# Patient Record
Sex: FEMALE | Race: WHITE | Employment: FULL TIME | URBAN - METROPOLITAN AREA
[De-identification: names, ages, dates, MRNs, and addresses within clinical notes are randomized per-mention and may not be internally consistent; named-entity substitution may affect disease eponyms.]

---

## 2017-01-03 ENCOUNTER — TELEPHONE (OUTPATIENT)
Dept: FAMILY MEDICINE CLINIC | Age: 47
End: 2017-01-03

## 2017-02-03 ENCOUNTER — OFFICE VISIT (OUTPATIENT)
Dept: FAMILY MEDICINE CLINIC | Age: 47
End: 2017-02-03

## 2017-02-03 VITALS
WEIGHT: 200 LBS | BODY MASS INDEX: 34.87 KG/M2 | SYSTOLIC BLOOD PRESSURE: 126 MMHG | TEMPERATURE: 98.4 F | RESPIRATION RATE: 20 BRPM | DIASTOLIC BLOOD PRESSURE: 74 MMHG | HEART RATE: 92 BPM

## 2017-02-03 DIAGNOSIS — J01.90 ACUTE NON-RECURRENT SINUSITIS, UNSPECIFIED LOCATION: Primary | ICD-10-CM

## 2017-02-03 PROCEDURE — 99213 OFFICE O/P EST LOW 20 MIN: CPT | Performed by: FAMILY MEDICINE

## 2017-02-03 RX ORDER — BENZONATATE 200 MG/1
200 CAPSULE ORAL 3 TIMES DAILY PRN
Qty: 30 CAPSULE | Refills: 0 | Status: SHIPPED | OUTPATIENT
Start: 2017-02-03 | End: 2017-02-10

## 2017-02-03 RX ORDER — AMOXICILLIN AND CLAVULANATE POTASSIUM 875; 125 MG/1; MG/1
1 TABLET, FILM COATED ORAL EVERY 12 HOURS
Qty: 20 TABLET | Refills: 0 | Status: SHIPPED | OUTPATIENT
Start: 2017-02-03 | End: 2017-02-13

## 2017-02-15 ENCOUNTER — E-VISIT (OUTPATIENT)
Dept: FAMILY MEDICINE CLINIC | Age: 47
End: 2017-02-15

## 2017-02-15 DIAGNOSIS — J01.90 ACUTE SINUSITIS, RECURRENCE NOT SPECIFIED, UNSPECIFIED LOCATION: Primary | ICD-10-CM

## 2017-02-15 PROCEDURE — 99444 PR PHYSICIAN ONLINE EVALUATION & MANAGEMENT SERVICE: CPT | Performed by: FAMILY MEDICINE

## 2017-02-15 RX ORDER — AMOXICILLIN AND CLAVULANATE POTASSIUM 875; 125 MG/1; MG/1
1 TABLET, FILM COATED ORAL EVERY 12 HOURS
Qty: 20 TABLET | Refills: 0 | Status: SHIPPED | OUTPATIENT
Start: 2017-02-15 | End: 2017-02-17

## 2017-02-15 ASSESSMENT — LIFESTYLE VARIABLES: SMOKING_STATUS: NO

## 2017-02-17 ENCOUNTER — PATIENT MESSAGE (OUTPATIENT)
Dept: FAMILY MEDICINE CLINIC | Age: 47
End: 2017-02-17

## 2017-02-17 RX ORDER — CEFUROXIME AXETIL 500 MG/1
500 TABLET ORAL 2 TIMES DAILY
Qty: 14 TABLET | Refills: 0 | Status: SHIPPED | OUTPATIENT
Start: 2017-02-17 | End: 2017-02-24

## 2017-03-21 ENCOUNTER — OFFICE VISIT (OUTPATIENT)
Dept: FAMILY MEDICINE CLINIC | Age: 47
End: 2017-03-21

## 2017-03-21 VITALS
HEART RATE: 83 BPM | TEMPERATURE: 98 F | RESPIRATION RATE: 22 BRPM | BODY MASS INDEX: 34.87 KG/M2 | DIASTOLIC BLOOD PRESSURE: 79 MMHG | WEIGHT: 200 LBS | SYSTOLIC BLOOD PRESSURE: 120 MMHG | OXYGEN SATURATION: 98 %

## 2017-03-21 DIAGNOSIS — R27.8 CLUMSINESS: ICD-10-CM

## 2017-03-21 DIAGNOSIS — E78.00 PURE HYPERCHOLESTEROLEMIA: ICD-10-CM

## 2017-03-21 DIAGNOSIS — R22.31 AXILLARY MASS, RIGHT: Primary | ICD-10-CM

## 2017-03-21 PROCEDURE — 99214 OFFICE O/P EST MOD 30 MIN: CPT | Performed by: FAMILY MEDICINE

## 2017-04-01 DIAGNOSIS — E78.00 PURE HYPERCHOLESTEROLEMIA: ICD-10-CM

## 2017-04-01 LAB
A/G RATIO: 1.3 (ref 1.1–2.2)
ALBUMIN SERPL-MCNC: 4.3 G/DL (ref 3.4–5)
ALP BLD-CCNC: 81 U/L (ref 40–129)
ALT SERPL-CCNC: 24 U/L (ref 10–40)
ANION GAP SERPL CALCULATED.3IONS-SCNC: 15 MMOL/L (ref 3–16)
AST SERPL-CCNC: 21 U/L (ref 15–37)
BILIRUB SERPL-MCNC: 0.6 MG/DL (ref 0–1)
BUN BLDV-MCNC: 9 MG/DL (ref 7–20)
CALCIUM SERPL-MCNC: 9.6 MG/DL (ref 8.3–10.6)
CHLORIDE BLD-SCNC: 101 MMOL/L (ref 99–110)
CHOLESTEROL, TOTAL: 206 MG/DL (ref 0–199)
CO2: 26 MMOL/L (ref 21–32)
CREAT SERPL-MCNC: 0.5 MG/DL (ref 0.6–1.1)
GFR AFRICAN AMERICAN: >60
GFR NON-AFRICAN AMERICAN: >60
GLOBULIN: 3.2 G/DL
GLUCOSE BLD-MCNC: 91 MG/DL (ref 70–99)
HDLC SERPL-MCNC: 40 MG/DL (ref 40–60)
LDL CHOLESTEROL CALCULATED: 134 MG/DL
POTASSIUM SERPL-SCNC: 4.9 MMOL/L (ref 3.5–5.1)
SODIUM BLD-SCNC: 142 MMOL/L (ref 136–145)
TOTAL PROTEIN: 7.5 G/DL (ref 6.4–8.2)
TRIGL SERPL-MCNC: 159 MG/DL (ref 0–150)
TSH REFLEX: 2.23 UIU/ML (ref 0.27–4.2)
VLDLC SERPL CALC-MCNC: 32 MG/DL

## 2017-04-17 ENCOUNTER — OFFICE VISIT (OUTPATIENT)
Dept: DERMATOLOGY | Age: 47
End: 2017-04-17

## 2017-04-17 DIAGNOSIS — L82.1 SEBORRHEIC KERATOSIS: ICD-10-CM

## 2017-04-17 DIAGNOSIS — Z80.8 FAMILY HISTORY OF MELANOMA: ICD-10-CM

## 2017-04-17 DIAGNOSIS — D22.9 MULTIPLE NEVI: Primary | ICD-10-CM

## 2017-04-17 DIAGNOSIS — L30.9 DERMATITIS: ICD-10-CM

## 2017-04-17 PROCEDURE — 99214 OFFICE O/P EST MOD 30 MIN: CPT | Performed by: DERMATOLOGY

## 2017-04-17 RX ORDER — CLOBETASOL PROPIONATE 0.5 MG/G
CREAM TOPICAL
Qty: 30 G | Refills: 0 | Status: SHIPPED | OUTPATIENT
Start: 2017-04-17 | End: 2018-04-23

## 2017-04-27 DIAGNOSIS — G43.009 MIGRAINE WITHOUT AURA AND WITHOUT STATUS MIGRAINOSUS, NOT INTRACTABLE: ICD-10-CM

## 2017-04-27 DIAGNOSIS — R00.2 PALPITATIONS: ICD-10-CM

## 2017-05-10 RX ORDER — PROPRANOLOL HYDROCHLORIDE 10 MG/1
TABLET ORAL
Qty: 90 TABLET | Refills: 1 | Status: SHIPPED | OUTPATIENT
Start: 2017-05-10 | End: 2020-02-07 | Stop reason: SDUPTHER

## 2017-05-10 RX ORDER — SUMATRIPTAN 50 MG/1
50 TABLET, FILM COATED ORAL
Qty: 27 TABLET | Refills: 1 | Status: SHIPPED | OUTPATIENT
Start: 2017-05-10 | End: 2020-08-07 | Stop reason: ALTCHOICE

## 2017-12-20 ENCOUNTER — TELEPHONE (OUTPATIENT)
Dept: FAMILY MEDICINE CLINIC | Age: 47
End: 2017-12-20

## 2017-12-20 NOTE — TELEPHONE ENCOUNTER
Per HIPPA it is ok to leave message on patient voicemail  [facility] informing pt of Dr. Weems Cluster message to for her to call back if she has any other concerns.

## 2017-12-20 NOTE — TELEPHONE ENCOUNTER
Tell her to keep a diary of everything she ate 12 to 24 hours before the rxn.   If it happens again she can look back to see if there is a trend

## 2017-12-20 NOTE — TELEPHONE ENCOUNTER
While calling pilar flores for PT/INR maintenance plan I was on the phone w/ his daughter Parish Edouard and informed her of the INR recheck and plan. Meanwhile she stated that she went to the ER last night for an allergic reaction to something but does not know what it was because she did not have anything new to eat or drink and no new meds she does not know how it happened. She states she had swollen lips, red and numb. She did no have any SOB, trouble breathing or swollen throat. They gave her famotidine, benadryl and prednisone to take and told her to f/u w/PCP if need be. She would like to know if there is anything else you would like for her to do.  Please advise

## 2018-03-23 ENCOUNTER — PATIENT MESSAGE (OUTPATIENT)
Dept: FAMILY MEDICINE CLINIC | Age: 48
End: 2018-03-23

## 2018-03-23 DIAGNOSIS — J01.00 ACUTE NON-RECURRENT MAXILLARY SINUSITIS: Primary | ICD-10-CM

## 2018-03-23 RX ORDER — CEFUROXIME AXETIL 500 MG/1
500 TABLET ORAL 2 TIMES DAILY
Qty: 20 TABLET | Refills: 0 | Status: SHIPPED | OUTPATIENT
Start: 2018-03-23 | End: 2018-04-02

## 2018-04-23 ENCOUNTER — OFFICE VISIT (OUTPATIENT)
Dept: DERMATOLOGY | Age: 48
End: 2018-04-23

## 2018-04-23 DIAGNOSIS — L82.1 SEBORRHEIC KERATOSIS: ICD-10-CM

## 2018-04-23 DIAGNOSIS — L30.9 DERMATITIS: ICD-10-CM

## 2018-04-23 DIAGNOSIS — Z80.8 FAMILY HISTORY OF MALIGNANT MELANOMA: Primary | ICD-10-CM

## 2018-04-23 DIAGNOSIS — D22.9 MULTIPLE NEVI: ICD-10-CM

## 2018-04-23 PROCEDURE — G8427 DOCREV CUR MEDS BY ELIG CLIN: HCPCS | Performed by: DERMATOLOGY

## 2018-04-23 PROCEDURE — 1036F TOBACCO NON-USER: CPT | Performed by: DERMATOLOGY

## 2018-04-23 PROCEDURE — G8421 BMI NOT CALCULATED: HCPCS | Performed by: DERMATOLOGY

## 2018-04-23 PROCEDURE — 99214 OFFICE O/P EST MOD 30 MIN: CPT | Performed by: DERMATOLOGY

## 2018-04-23 RX ORDER — CLOBETASOL PROPIONATE 0.5 MG/G
OINTMENT TOPICAL
Qty: 60 G | Refills: 1 | Status: SHIPPED | OUTPATIENT
Start: 2018-04-23 | End: 2018-10-15 | Stop reason: SDUPTHER

## 2018-04-23 RX ORDER — FLUOCINONIDE 1 MG/G
CREAM TOPICAL
Qty: 120 G | Refills: 1 | Status: SHIPPED | OUTPATIENT
Start: 2018-04-23 | End: 2018-08-03 | Stop reason: SDUPTHER

## 2018-07-25 NOTE — TELEPHONE ENCOUNTER
From: Charli Devi  Sent: 7/25/2018 10:13 AM EDT  Subject: Medication Renewal Request    Karen Gray.  Mike would like a refill of the following medications:     Fluocinonide (VANOS) 0.1 % CREA [Evelia Prince MD]     clobetasol (TEMOVATE) 0.05 % ointment Keesha Acosta MD]    Preferred pharmacy: [facility] [address] [facility] [place]

## 2018-07-26 RX ORDER — CLOBETASOL PROPIONATE 0.5 MG/G
OINTMENT TOPICAL
Qty: 60 G | Refills: 1 | OUTPATIENT
Start: 2018-07-26

## 2018-07-26 RX ORDER — FLUOCINONIDE 1 MG/G
CREAM TOPICAL
Qty: 120 G | Refills: 1 | OUTPATIENT
Start: 2018-07-26

## 2018-08-03 RX ORDER — FLUOCINONIDE 1 MG/G
CREAM TOPICAL
Qty: 120 G | Refills: 0 | Status: SHIPPED | OUTPATIENT
Start: 2018-08-03 | End: 2018-10-15 | Stop reason: SDUPTHER

## 2018-10-15 RX ORDER — FLUOCINONIDE 1 MG/G
CREAM TOPICAL
Qty: 120 G | Refills: 0 | Status: SHIPPED | OUTPATIENT
Start: 2018-10-15 | End: 2020-08-07 | Stop reason: SDUPTHER

## 2018-10-15 RX ORDER — CLOBETASOL PROPIONATE 0.5 MG/G
OINTMENT TOPICAL
Qty: 60 G | Refills: 1 | Status: SHIPPED | OUTPATIENT
Start: 2018-10-15 | End: 2020-02-07 | Stop reason: SDUPTHER

## 2019-04-10 ENCOUNTER — OFFICE VISIT (OUTPATIENT)
Dept: FAMILY MEDICINE CLINIC | Age: 49
End: 2019-04-10
Payer: COMMERCIAL

## 2019-04-10 VITALS
OXYGEN SATURATION: 97 % | TEMPERATURE: 97.1 F | SYSTOLIC BLOOD PRESSURE: 132 MMHG | WEIGHT: 229.5 LBS | BODY MASS INDEX: 40.02 KG/M2 | HEART RATE: 89 BPM | RESPIRATION RATE: 16 BRPM | DIASTOLIC BLOOD PRESSURE: 78 MMHG

## 2019-04-10 DIAGNOSIS — Z00.00 WELL ADULT EXAM: ICD-10-CM

## 2019-04-10 DIAGNOSIS — F41.1 GENERALIZED ANXIETY DISORDER: ICD-10-CM

## 2019-04-10 DIAGNOSIS — J20.9 ACUTE BRONCHITIS, UNSPECIFIED ORGANISM: Primary | ICD-10-CM

## 2019-04-10 DIAGNOSIS — R20.0 NUMBNESS OF TOES: ICD-10-CM

## 2019-04-10 LAB
A/G RATIO: 1.2 (ref 1.1–2.2)
ALBUMIN SERPL-MCNC: 4.4 G/DL (ref 3.4–5)
ALP BLD-CCNC: 107 U/L (ref 40–129)
ALT SERPL-CCNC: 57 U/L (ref 10–40)
ANION GAP SERPL CALCULATED.3IONS-SCNC: 13 MMOL/L (ref 3–16)
AST SERPL-CCNC: 64 U/L (ref 15–37)
BILIRUB SERPL-MCNC: 0.6 MG/DL (ref 0–1)
BUN BLDV-MCNC: 8 MG/DL (ref 7–20)
CALCIUM SERPL-MCNC: 9.9 MG/DL (ref 8.3–10.6)
CHLORIDE BLD-SCNC: 97 MMOL/L (ref 99–110)
CHOLESTEROL, TOTAL: 214 MG/DL (ref 0–199)
CO2: 26 MMOL/L (ref 21–32)
CREAT SERPL-MCNC: 0.6 MG/DL (ref 0.6–1.1)
GFR AFRICAN AMERICAN: >60
GFR NON-AFRICAN AMERICAN: >60
GLOBULIN: 3.8 G/DL
GLUCOSE BLD-MCNC: 106 MG/DL (ref 70–99)
HCT VFR BLD CALC: 37.6 % (ref 36–48)
HDLC SERPL-MCNC: 37 MG/DL (ref 40–60)
HEMOGLOBIN: 12.1 G/DL (ref 12–16)
LDL CHOLESTEROL CALCULATED: 137 MG/DL
MCH RBC QN AUTO: 25.6 PG (ref 26–34)
MCHC RBC AUTO-ENTMCNC: 32.3 G/DL (ref 31–36)
MCV RBC AUTO: 79.3 FL (ref 80–100)
PDW BLD-RTO: 17.5 % (ref 12.4–15.4)
PLATELET # BLD: 357 K/UL (ref 135–450)
PMV BLD AUTO: 8.7 FL (ref 5–10.5)
POTASSIUM SERPL-SCNC: 4.9 MMOL/L (ref 3.5–5.1)
RBC # BLD: 4.74 M/UL (ref 4–5.2)
SODIUM BLD-SCNC: 136 MMOL/L (ref 136–145)
TOTAL PROTEIN: 8.2 G/DL (ref 6.4–8.2)
TRIGL SERPL-MCNC: 201 MG/DL (ref 0–150)
TSH REFLEX: 3.03 UIU/ML (ref 0.27–4.2)
VITAMIN B-12: 476 PG/ML (ref 211–911)
VLDLC SERPL CALC-MCNC: 40 MG/DL
WBC # BLD: 12.6 K/UL (ref 4–11)

## 2019-04-10 PROCEDURE — 3017F COLORECTAL CA SCREEN DOC REV: CPT | Performed by: FAMILY MEDICINE

## 2019-04-10 PROCEDURE — G8417 CALC BMI ABV UP PARAM F/U: HCPCS | Performed by: FAMILY MEDICINE

## 2019-04-10 PROCEDURE — G8427 DOCREV CUR MEDS BY ELIG CLIN: HCPCS | Performed by: FAMILY MEDICINE

## 2019-04-10 PROCEDURE — 1036F TOBACCO NON-USER: CPT | Performed by: FAMILY MEDICINE

## 2019-04-10 PROCEDURE — 99214 OFFICE O/P EST MOD 30 MIN: CPT | Performed by: FAMILY MEDICINE

## 2019-04-10 RX ORDER — AZITHROMYCIN 250 MG/1
TABLET, FILM COATED ORAL
Qty: 1 PACKET | Refills: 0 | Status: SHIPPED | OUTPATIENT
Start: 2019-04-10 | End: 2019-04-20

## 2019-04-10 RX ORDER — ESCITALOPRAM OXALATE 10 MG/1
10 TABLET ORAL DAILY
Qty: 30 TABLET | Refills: 5 | Status: SHIPPED | OUTPATIENT
Start: 2019-04-10 | End: 2019-07-12 | Stop reason: SDUPTHER

## 2019-04-10 RX ORDER — SUMATRIPTAN 50 MG/1
50 TABLET, FILM COATED ORAL
COMMUNITY
End: 2019-07-12

## 2019-04-10 ASSESSMENT — PATIENT HEALTH QUESTIONNAIRE - PHQ9
1. LITTLE INTEREST OR PLEASURE IN DOING THINGS: 1
SUM OF ALL RESPONSES TO PHQ9 QUESTIONS 1 & 2: 2
2. FEELING DOWN, DEPRESSED OR HOPELESS: 1
SUM OF ALL RESPONSES TO PHQ QUESTIONS 1-9: 2
SUM OF ALL RESPONSES TO PHQ QUESTIONS 1-9: 2

## 2019-04-10 NOTE — PROGRESS NOTES
oil-omega-3 fatty acids 1000 MG capsule Take 1 g by mouth daily.  ibuprofen (ADVIL;MOTRIN) 800 MG tablet Take 800 mg by mouth every 6 hours as needed. 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 No pertinent past medical history. History reviewed. No pertinent surgical history. Family History   Problem Relation Age of Onset    Stroke Father     High Cholesterol Father     Diabetes Mother     Hypertension Mother     Coronary Art Dis Mother    [place] Migraines Mother         complex    High Cholesterol Mother        Social History     Tobacco Use    Smoking status: Never Smoker    Smokeless tobacco: Never Used   Substance Use Topics    Alcohol use: Yes     Alcohol/week: 0.0 oz     Comment: 1 per month    Drug use: No            Review of Systems  Review of Systems    Objective:   Physical Exam  Vitals:    04/10/19 1113 04/10/19 1135   BP: (!) 144/88 132/78   Pulse: 89    Resp: 16    Temp: 97.1 °F (36.2 °C)    TempSrc: Oral    SpO2: 97%    Weight: 229 lb 8 oz (104.1 kg)        Physical Exam    NAD    No respiratory distress. Mood and affect are mildy anxious. No agitation or psychomotor retardation. Not suicidal. Judgement and insight are intact  Skin turgor normal, no rash. ar exam - bilateral normal, TM intact without perforation or effusion, external canal normal. No significant ceruminosis noted. Nasal exam; septum midline, no deformities, nares patent, normal mucosa with mild swelling, no polyps, no bleeding. Pharynx normal, no oral lesions, dentition in good repair. No cervical, supraclavicular or submandibular LNS.     Lungs bilateral rhonchi, no wheeze or rales. No accessory muscle use. Card reg with no murmer or gallop  Ext - no c/c/e   Bilateral bunions. Sensation normal to monofilament bilateral except directly over bunions. Assessment:       Diagnosis Orders   1. Acute bronchitis, unspecified organism  azithromycin (ZITHROMAX) 250 MG tablet   2. Numbness of toes  Hemoglobin A1C  Like secondary to pressure from shoes; advised shoes with wide toe box. 3. Generalized anxiety disorder  escitalopram (LEXAPRO) 10 MG tablet  Refer to Dr. Kenisha Banks for counseling     4. Well adult exam  Comprehensive Metabolic Panel    TSH with Reflex    Lipid Panel    CBC    Vitamin B12          Plan:      Side effects of current medications reviewed and questions answered. Follow up in 4 weeks or prn.

## 2019-04-11 ENCOUNTER — PATIENT MESSAGE (OUTPATIENT)
Dept: FAMILY MEDICINE CLINIC | Age: 49
End: 2019-04-11

## 2019-04-11 DIAGNOSIS — R74.8 ELEVATED LIVER ENZYMES: ICD-10-CM

## 2019-04-11 DIAGNOSIS — D50.0 IRON DEFICIENCY ANEMIA DUE TO CHRONIC BLOOD LOSS: ICD-10-CM

## 2019-04-11 DIAGNOSIS — E78.00 PURE HYPERCHOLESTEROLEMIA: ICD-10-CM

## 2019-04-11 DIAGNOSIS — D64.9 ANEMIA, UNSPECIFIED TYPE: Primary | ICD-10-CM

## 2019-04-11 DIAGNOSIS — D64.9 ANEMIA, UNSPECIFIED TYPE: ICD-10-CM

## 2019-04-11 DIAGNOSIS — R73.03 PREDIABETES: Primary | ICD-10-CM

## 2019-04-11 LAB
ESTIMATED AVERAGE GLUCOSE: 125.5 MG/DL
FERRITIN: 80.1 NG/ML (ref 15–150)
HBA1C MFR BLD: 6 %
IRON SATURATION: 10 % (ref 15–50)
IRON: 35 UG/DL (ref 37–145)
TOTAL IRON BINDING CAPACITY: 364 UG/DL (ref 260–445)

## 2019-04-11 RX ORDER — METFORMIN HYDROCHLORIDE 500 MG/1
1000 TABLET, EXTENDED RELEASE ORAL
Qty: 180 TABLET | Refills: 1 | Status: SHIPPED | OUTPATIENT
Start: 2019-04-11 | End: 2019-08-22 | Stop reason: SDUPTHER

## 2019-04-11 NOTE — TELEPHONE ENCOUNTER
From: Janna Rendon  To: Lorie Harrison MD  Sent: 4/11/2019 9:29 AM EDT  Subject: Test Results Question    Hi Dr. Lucio Later,    The test results, while disappointing, were not all that surprising. Please go ahead and prescribe the Metformin and I will also focus on diet and exercise. I will schedule a follow up in 3 months.      Thanks,  Mateusz Johns

## 2019-04-25 ENCOUNTER — OFFICE VISIT (OUTPATIENT)
Dept: DERMATOLOGY | Age: 49
End: 2019-04-25
Payer: COMMERCIAL

## 2019-04-25 DIAGNOSIS — D22.9 MULTIPLE NEVI: Primary | ICD-10-CM

## 2019-04-25 DIAGNOSIS — Z80.8 FAMILY HISTORY OF MALIGNANT MELANOMA: ICD-10-CM

## 2019-04-25 DIAGNOSIS — L82.1 SEBORRHEIC KERATOSIS: ICD-10-CM

## 2019-04-25 DIAGNOSIS — L30.9 DERMATITIS: ICD-10-CM

## 2019-04-25 PROCEDURE — G8427 DOCREV CUR MEDS BY ELIG CLIN: HCPCS | Performed by: DERMATOLOGY

## 2019-04-25 PROCEDURE — 3017F COLORECTAL CA SCREEN DOC REV: CPT | Performed by: DERMATOLOGY

## 2019-04-25 PROCEDURE — G8417 CALC BMI ABV UP PARAM F/U: HCPCS | Performed by: DERMATOLOGY

## 2019-04-25 PROCEDURE — 1036F TOBACCO NON-USER: CPT | Performed by: DERMATOLOGY

## 2019-04-25 PROCEDURE — 99214 OFFICE O/P EST MOD 30 MIN: CPT | Performed by: DERMATOLOGY

## 2019-04-25 RX ORDER — CLOBETASOL PROPIONATE 0.5 MG/G
CREAM TOPICAL
Qty: 45 G | Refills: 1 | Status: SHIPPED | OUTPATIENT
Start: 2019-04-25 | End: 2020-08-07 | Stop reason: SDUPTHER

## 2019-04-25 NOTE — PROGRESS NOTES
[facility]  Rylan Pyle MD  [phone]      Murrell Jesus Manuel  1970    46 y.o. female     Date of Visit: 4/25/2019    Chief Complaint: moles, rash  Chief Complaint   Patient presents with    Skin Exam     Last seen: ~ 1 year ago    History of Present Illness:    *Her mom passed away in fall of 2016. Now taking care of dad - 2018.    1, 2. Here for evaluation of multiple asx pigmented lesions on the trunk and extremities, present for many years; no change in size/shape/color of any lesions; no bleeding lesions. Family hx of melanoma. 3. F/u dermatitis - previously on the shin/legs, hands and scalp. rx'd clobetasol in the past - this helps. She has a few scaly pink spots on the R posterior leg recently - asx and no trx tried. No personal hx of skin cancer. Father with hx of melanoma. Wears sunscreen regularly. No tanning bed use. Review of Systems:  Gen: Feels well, good sense of health. Skin: No changing moles or lesions. Past Medical History, Family History, Surgical History, Medications and Allergies reviewed. History reviewed. No pertinent past medical history. History reviewed. No pertinent surgical history.     Outpatient Medications Marked as Taking for the 4/25/19 encounter (Office Visit) with Reta Murillo MD   Medication Sig Dispense Refill    metFORMIN (GLUCOPHAGE-XR) 500 MG extended release tablet Take 2 tablets by mouth daily (with breakfast) 180 tablet 1    SUMAtriptan (IMITREX) 50 MG tablet Take 50 mg by mouth once as needed for Migraine      escitalopram (LEXAPRO) 10 MG tablet Take 1 tablet by mouth daily 30 tablet 5    clobetasol (TEMOVATE) 0.05 % ointment Apply to affected area BID PRN flares 60 g 1    Fluocinonide (VANOS) 0.1 % CREA Apply to affected area BID PRN flares 120 g 0    propranolol (INDERAL) 10 MG tablet Take 1 tablet by mouth  every 6 hours as needed 90 tablet 1    Calcium Carb-Cholecalciferol (CALCIUM-VITAMIN D) 500-400 MG-UNIT TABS Take 1 tablet by mouth daily      atorvastatin (LIPITOR) 10 MG tablet TAKE 1 TABLET DAILY 90 tablet 0    therapeutic multivitamin-minerals (THERAGRAN-M) tablet Take 1 tablet by mouth daily.  fish oil-omega-3 fatty acids 1000 MG capsule Take 1 g by mouth daily.  ibuprofen (ADVIL;MOTRIN) 800 MG tablet Take 800 mg by mouth every 6 hours as needed. Allergies   Allergen Reactions    Demerol     Augmentin [Amoxicillin-Pot Clavulanate] Rash         Physical Examination     Gen, well-appearing  The following were examined and determined to be normal: Psych/Neuro, Scalp/hair, Head/face, Conjunctivae/eyelids, Gums/teeth/lips, Neck, Breast/axilla/chest, Back, LLE, Nails/digits. , buttocks, RUE, LUE, abdomen  The following were examined and determined to be abnormal: RLE  trunk and extremities with scattered brown macules and papules     R posterior calf with two ~ 1 cm scaly pink macules            Assessment and Plan     1. Benign-appearing nevi and SK's   2. Family hx of melanoma  - educ re ABCD's of MM   educ sun protection   encouraged skin check yearly (sooner if indicated), self checks    3. dermatitis - flaring only in 2 areas on the R posterior calf  - clobetasol cream bid until clear and prn flares; ed se/misuse  - rtn for bx if no improvement in the next 8 weeks on the R calf    F/u 1-2 years for FSE, sooner for dermatitis if no improvement in the next 2-3 mos.

## 2019-07-10 DIAGNOSIS — R74.8 ELEVATED LIVER ENZYMES: ICD-10-CM

## 2019-07-10 DIAGNOSIS — D50.0 IRON DEFICIENCY ANEMIA DUE TO CHRONIC BLOOD LOSS: ICD-10-CM

## 2019-07-10 DIAGNOSIS — E78.00 PURE HYPERCHOLESTEROLEMIA: ICD-10-CM

## 2019-07-10 DIAGNOSIS — R73.03 PREDIABETES: ICD-10-CM

## 2019-07-10 LAB
A/G RATIO: 1.4 (ref 1.1–2.2)
ALBUMIN SERPL-MCNC: 4.6 G/DL (ref 3.4–5)
ALP BLD-CCNC: 89 U/L (ref 40–129)
ALT SERPL-CCNC: 59 U/L (ref 10–40)
ANION GAP SERPL CALCULATED.3IONS-SCNC: 14 MMOL/L (ref 3–16)
AST SERPL-CCNC: 63 U/L (ref 15–37)
BILIRUB SERPL-MCNC: 0.6 MG/DL (ref 0–1)
BUN BLDV-MCNC: 10 MG/DL (ref 7–20)
CALCIUM SERPL-MCNC: 10.5 MG/DL (ref 8.3–10.6)
CHLORIDE BLD-SCNC: 96 MMOL/L (ref 99–110)
CHOLESTEROL, TOTAL: 211 MG/DL (ref 0–199)
CO2: 28 MMOL/L (ref 21–32)
CREAT SERPL-MCNC: 0.5 MG/DL (ref 0.6–1.1)
FERRITIN: 109.4 NG/ML (ref 15–150)
GFR AFRICAN AMERICAN: >60
GFR NON-AFRICAN AMERICAN: >60
GLOBULIN: 3.2 G/DL
GLUCOSE BLD-MCNC: 99 MG/DL (ref 70–99)
HCT VFR BLD CALC: 39.2 % (ref 36–48)
HDLC SERPL-MCNC: 41 MG/DL (ref 40–60)
HEMOGLOBIN: 12.7 G/DL (ref 12–16)
IRON SATURATION: 15 % (ref 15–50)
IRON: 52 UG/DL (ref 37–145)
LDL CHOLESTEROL CALCULATED: 123 MG/DL
MCH RBC QN AUTO: 26.6 PG (ref 26–34)
MCHC RBC AUTO-ENTMCNC: 32.4 G/DL (ref 31–36)
MCV RBC AUTO: 82.1 FL (ref 80–100)
PDW BLD-RTO: 17.4 % (ref 12.4–15.4)
PLATELET # BLD: 359 K/UL (ref 135–450)
PMV BLD AUTO: 8.6 FL (ref 5–10.5)
POTASSIUM SERPL-SCNC: 4.3 MMOL/L (ref 3.5–5.1)
RBC # BLD: 4.77 M/UL (ref 4–5.2)
SODIUM BLD-SCNC: 138 MMOL/L (ref 136–145)
TOTAL IRON BINDING CAPACITY: 349 UG/DL (ref 260–445)
TOTAL PROTEIN: 7.8 G/DL (ref 6.4–8.2)
TRIGL SERPL-MCNC: 233 MG/DL (ref 0–150)
VLDLC SERPL CALC-MCNC: 47 MG/DL
WBC # BLD: 11.6 K/UL (ref 4–11)

## 2019-07-11 PROBLEM — R73.03 PREDIABETES: Status: ACTIVE | Noted: 2019-07-11

## 2019-07-11 LAB
ESTIMATED AVERAGE GLUCOSE: 114 MG/DL
HBA1C MFR BLD: 5.6 %

## 2019-07-11 NOTE — PROGRESS NOTES
Subjective:      Patient ID: Rebekah Lipoma 46 y.o. female. The primary encounter diagnosis was Pure hypercholesterolemia. Diagnoses of Prediabetes, Anxiety state, Elevated liver function tests, and Colon cancer screening were also pertinent to this visit. HPI    Diet eating better. Trying to improve. Exercise: walks. Considering adding resistance training. Sleeping pretty well with melatonin. Tried meditation for 3 weeks. Anxiety is well controlled with lexapro. Some stress with dad in Hospice care; he may be \"kicked out\" of hospice as he is doing well. No appetite or sleep disturbance, no anhedonia, Not suicidal.      Has loose stool with metformin. Has intermittent bright red blood in stool and on TP. The blood stops if she stops the metformin. She holds it when she travels. Hyperlipidemia:  No new myalgias or GI upset on none. Medication compliance: compliant most of the time. Patient is  following a low fat, low cholesterol diet. She is  exercising regularly.      Lab Results   Component Value Date    CHOL 211 (H) 07/10/2019    TRIG 233 (H) 07/10/2019    HDL 41 07/10/2019    LDLCALC 123 (H) 07/10/2019     Lab Results   Component Value Date    ALT 59 (H) 07/10/2019    AST 63 (H) 07/10/2019        Lab Results   Component Value Date     07/10/2019    K 4.3 07/10/2019    CL 96 07/10/2019    CO2 28 07/10/2019    BUN 10 07/10/2019    CREATININE 0.5 07/10/2019    GLUCOSE 99 07/10/2019    CALCIUM 10.5 07/10/2019      Lab Results   Component Value Date    LABA1C 5.6 07/10/2019     Lab Results   Component Value Date    .0 07/10/2019      TSH   Date Value Ref Range Status   04/10/2019 3.03 0.27 - 4.20 uIU/mL Final   04/01/2017 2.23 0.27 - 4.20 uIU/mL Final   06/24/2016 2.98 0.27 - 4.20 uIU/mL Final   11/02/2012 3.05 0.35 - 5.5 uIU/ml Final   09/23/2011 3.42 0.35 - 5.5 uIU/ml Final   09/09/2011 2.63 0.35 - 5.5 uIU/ml Final          Outpatient Medications Marked as Taking for the

## 2019-07-12 ENCOUNTER — OFFICE VISIT (OUTPATIENT)
Dept: FAMILY MEDICINE CLINIC | Age: 49
End: 2019-07-12
Payer: COMMERCIAL

## 2019-07-12 VITALS
DIASTOLIC BLOOD PRESSURE: 84 MMHG | RESPIRATION RATE: 12 BRPM | WEIGHT: 222 LBS | BODY MASS INDEX: 37.9 KG/M2 | SYSTOLIC BLOOD PRESSURE: 128 MMHG | HEART RATE: 82 BPM | OXYGEN SATURATION: 96 % | HEIGHT: 64 IN | TEMPERATURE: 96.8 F

## 2019-07-12 DIAGNOSIS — Z12.11 COLON CANCER SCREENING: ICD-10-CM

## 2019-07-12 DIAGNOSIS — R73.03 PREDIABETES: ICD-10-CM

## 2019-07-12 DIAGNOSIS — F41.1 ANXIETY STATE: ICD-10-CM

## 2019-07-12 DIAGNOSIS — R79.89 ELEVATED LIVER FUNCTION TESTS: ICD-10-CM

## 2019-07-12 DIAGNOSIS — F41.1 GENERALIZED ANXIETY DISORDER: ICD-10-CM

## 2019-07-12 DIAGNOSIS — E78.00 PURE HYPERCHOLESTEROLEMIA: Primary | ICD-10-CM

## 2019-07-12 PROCEDURE — G8417 CALC BMI ABV UP PARAM F/U: HCPCS | Performed by: FAMILY MEDICINE

## 2019-07-12 PROCEDURE — 99214 OFFICE O/P EST MOD 30 MIN: CPT | Performed by: FAMILY MEDICINE

## 2019-07-12 PROCEDURE — G8427 DOCREV CUR MEDS BY ELIG CLIN: HCPCS | Performed by: FAMILY MEDICINE

## 2019-07-12 PROCEDURE — 3017F COLORECTAL CA SCREEN DOC REV: CPT | Performed by: FAMILY MEDICINE

## 2019-07-12 PROCEDURE — 1036F TOBACCO NON-USER: CPT | Performed by: FAMILY MEDICINE

## 2019-07-12 RX ORDER — ESCITALOPRAM OXALATE 10 MG/1
10 TABLET ORAL DAILY
Qty: 90 TABLET | Refills: 1 | Status: SHIPPED | OUTPATIENT
Start: 2019-07-12 | End: 2019-08-22 | Stop reason: SDUPTHER

## 2019-07-12 RX ORDER — MELATONIN 3 MG
TABLET ORAL
COMMUNITY

## 2019-08-22 ENCOUNTER — PATIENT MESSAGE (OUTPATIENT)
Dept: FAMILY MEDICINE CLINIC | Age: 49
End: 2019-08-22

## 2019-08-22 DIAGNOSIS — F41.1 GENERALIZED ANXIETY DISORDER: ICD-10-CM

## 2019-08-22 RX ORDER — ESCITALOPRAM OXALATE 10 MG/1
10 TABLET ORAL DAILY
Qty: 30 TABLET | Refills: 0 | Status: SHIPPED | OUTPATIENT
Start: 2019-08-22 | End: 2019-09-26 | Stop reason: SDUPTHER

## 2019-08-22 RX ORDER — METFORMIN HYDROCHLORIDE 500 MG/1
1000 TABLET, EXTENDED RELEASE ORAL
Qty: 60 TABLET | Refills: 0 | Status: CANCELLED | OUTPATIENT
Start: 2019-08-22

## 2019-08-22 RX ORDER — METFORMIN HYDROCHLORIDE 500 MG/1
1000 TABLET, EXTENDED RELEASE ORAL
Qty: 180 TABLET | Refills: 0 | Status: SHIPPED | OUTPATIENT
Start: 2019-08-22 | End: 2019-11-12 | Stop reason: SDUPTHER

## 2020-02-04 DIAGNOSIS — R73.03 PREDIABETES: ICD-10-CM

## 2020-02-04 DIAGNOSIS — E78.00 PURE HYPERCHOLESTEROLEMIA: ICD-10-CM

## 2020-02-04 LAB
A/G RATIO: 1.3 (ref 1.1–2.2)
ALBUMIN SERPL-MCNC: 4.4 G/DL (ref 3.4–5)
ALP BLD-CCNC: 91 U/L (ref 40–129)
ALT SERPL-CCNC: 52 U/L (ref 10–40)
ANION GAP SERPL CALCULATED.3IONS-SCNC: 16 MMOL/L (ref 3–16)
AST SERPL-CCNC: 51 U/L (ref 15–37)
BILIRUB SERPL-MCNC: 1.2 MG/DL (ref 0–1)
BUN BLDV-MCNC: 8 MG/DL (ref 7–20)
CALCIUM SERPL-MCNC: 10.3 MG/DL (ref 8.3–10.6)
CHLORIDE BLD-SCNC: 95 MMOL/L (ref 99–110)
CHOLESTEROL, TOTAL: 222 MG/DL (ref 0–199)
CO2: 27 MMOL/L (ref 21–32)
CREAT SERPL-MCNC: 0.6 MG/DL (ref 0.6–1.1)
ESTIMATED AVERAGE GLUCOSE: 116.9 MG/DL
GFR AFRICAN AMERICAN: >60
GFR NON-AFRICAN AMERICAN: >60
GLOBULIN: 3.4 G/DL
GLUCOSE BLD-MCNC: 121 MG/DL (ref 70–99)
HBA1C MFR BLD: 5.7 %
HDLC SERPL-MCNC: 37 MG/DL (ref 40–60)
LDL CHOLESTEROL CALCULATED: 150 MG/DL
POTASSIUM SERPL-SCNC: 4.6 MMOL/L (ref 3.5–5.1)
SODIUM BLD-SCNC: 138 MMOL/L (ref 136–145)
TOTAL PROTEIN: 7.8 G/DL (ref 6.4–8.2)
TRIGL SERPL-MCNC: 173 MG/DL (ref 0–150)
VLDLC SERPL CALC-MCNC: 35 MG/DL

## 2020-02-06 PROBLEM — K76.0 FATTY LIVER: Status: ACTIVE | Noted: 2020-02-06

## 2020-02-06 PROBLEM — R27.8 CLUMSINESS: Status: RESOLVED | Noted: 2017-03-21 | Resolved: 2020-02-06

## 2020-02-06 NOTE — PROGRESS NOTES
Subjective:      Patient ID: Jeimy Joseph 44 y.o. female. The primary encounter diagnosis was Pure hypercholesterolemia. Diagnoses of Fatty liver, Prediabetes, and Panic disorder without agoraphobia were also pertinent to this visit. FABRIZIO Jolley has US for evaluation of elevated transaminases. The US showed likely steatosis with an area of fatty sparing verses mass. MRI was recommended and is pending. She was referred to Dr. Sulema Dickerson. Has appt with him in April. Has MRI tomorrow. Would like rx  She drinks alcohol very rarely. She takes occ Tylenol for HA>   She started to make better choices since the first of the year and is going to the gym. She has a migraine occ. Can go weeks with no HA, then have one a few days in a row. Tylenol does not work. Imitrex helps but she wonders if she can take it with her liver disease. She has rare caffeine,  Drinks plenty of water. Has good sleep routine. Menses are irregular - last menses end of December. Last 2 days. Has gone up to 8 weeks without a period    Is trying to get colonoscopy scheduled with Dr. Mariia Thomas. Will sched PAP with Dr. Aranda Patient. Mood has been well controlled. Stress dealing with elderly father but managing well.   No appetite or sleep disturbance, no anhedonia, Not suicidal.      Outpatient Medications Marked as Taking for the 2/7/20 encounter (Office Visit) with Vero Dumont MD   Medication Sig Dispense Refill    metFORMIN (GLUCOPHAGE-XR) 500 MG extended release tablet Take 2 tablets by mouth daily (with breakfast) (Patient taking differently: Take 500 mg by mouth daily (with breakfast) ) 180 tablet 0    escitalopram (LEXAPRO) 10 MG tablet Take 1 tablet by mouth daily 90 tablet 1    IRON PO Take 1 tablet by mouth daily      Melatonin 3-10 MG TABS Take by mouth      clobetasol (TEMOVATE) 0.05 % cream Apply to affected area BID PRN flares 45 g 1    clobetasol (TEMOVATE) 0.05 % ointment Apply to affected area BID PRN 02/04/2020    LABGLOM >60 02/04/2020    GFRAA >60 02/04/2020    AGRATIO 1.3 02/04/2020    GLOB 3.4 02/04/2020       Lab Results   Component Value Date    LABA1C 5.7 02/04/2020     Lab Results   Component Value Date    .9 02/04/2020      Lab Results   Component Value Date    WBC 11.6 (H) 07/10/2019    HGB 12.7 07/10/2019    HCT 39.2 07/10/2019    MCV 82.1 07/10/2019     07/10/2019      TSH   Date Value Ref Range Status   04/10/2019 3.03 0.27 - 4.20 uIU/mL Final   04/01/2017 2.23 0.27 - 4.20 uIU/mL Final   06/24/2016 2.98 0.27 - 4.20 uIU/mL Final   11/02/2012 3.05 0.35 - 5.5 uIU/ml Final   09/23/2011 3.42 0.35 - 5.5 uIU/ml Final   09/09/2011 2.63 0.35 - 5.5 uIU/ml Final      Lab Results   Component Value Date    CHOL 222 (H) 02/04/2020    CHOL 211 (H) 07/10/2019    CHOL 214 (H) 04/10/2019     Lab Results   Component Value Date    TRIG 173 (H) 02/04/2020    TRIG 233 (H) 07/10/2019    TRIG 201 (H) 04/10/2019     Lab Results   Component Value Date    HDL 37 (L) 02/04/2020    HDL 41 07/10/2019    HDL 37 (L) 04/10/2019     Lab Results   Component Value Date    LDLCALC 150 (H) 02/04/2020    LDLCALC 123 (H) 07/10/2019    LDLCALC 137 (H) 04/10/2019     Lab Results   Component Value Date    LABVLDL 35 02/04/2020    LABVLDL 47 07/10/2019    LABVLDL 40 04/10/2019     No results found for: [facility]       Site: Shira Gan #: [id number] #: [id number]: Russell Price #: [de-identified] #: [id number] #: [id number]: US RIGHT UPPER QUADRANTExam Date/Time: 01/25/2020 07:30 AMAdmitting Diagnosis: Reason for Exam:   Dictated by: Archie Ugarte BABS: 01/25/2020 08:08 AMT: This document is confidential medical information.  Unauthorized disclosure or use of this information is prohibited by law. If you are not the intended recipient of this document, please advise us by    calling immediately [phone].    Impression/Conclusion below       HISTORY:   Abnormal results of liver function studies Readings from Last 3 Encounters:   02/07/20 217 lb (98.4 kg)   07/12/19 222 lb (100.7 kg)   04/10/19 229 lb 8 oz (104.1 kg)         Physical Exam  NAD    Skin is warm and dry. No rash. Well hydrated  Alert and oriented x 3. Mood and affect are normal.  The neck is supple and free of adenopathy or masses, the thyroid is normal without enlargement or nodules. Chest: clear with no wheezes or rales. No retractions, or use of accessory muscles noted. Cardiovascular: PMI is not displaced, and no thrill noted. Regular rate and rhythm with no rub, murmur or gallop. No peripheral edema. No carotid bruits. The abdomen is soft without tenderness, guarding, mass, rebound or organomegaly. Aorta, femoral, DP and PT pulses intact. Assessment:       Diagnosis Orders   1. Pure hypercholesterolemia  atorvastatin (LIPITOR) 40 MG tablet    Lipid Panel    Comprehensive Metabolic Panel   2. Fatty liver  atorvastatin (LIPITOR) 40 MG tablet    ALPRAZolam (XANAX) 0.5 MG tablet  MRI to rule out mass vs fatty sparing. GI referral to evaluation for cirrhosis  Importance of low fat, high fiber, whole foods diet and wt loss addressed. Avoid alcohol  Limit Tylenol to 2000 mg per day   3. Prediabetes  metFORMIN (GLUCOPHAGE-XR) 500 MG extended release tablet   4. Panic disorder without agoraphobia  Controlled    5. Colon cancer screening  As planned   6. Migraine without aura and without status migrainosus, not intractable  Stop Imitrex due to liver disease. Ubrelvy 50 mg - avoid higher dose due to liver disease. 7. Palpitations  propranolol (INDERAL) 10 MG tablet          Plan:      Side effects of current medications reviewed and questions answered. Follow up in 6 months or prn.

## 2020-02-07 ENCOUNTER — OFFICE VISIT (OUTPATIENT)
Dept: FAMILY MEDICINE CLINIC | Age: 50
End: 2020-02-07
Payer: COMMERCIAL

## 2020-02-07 VITALS
BODY MASS INDEX: 37.05 KG/M2 | RESPIRATION RATE: 12 BRPM | WEIGHT: 217 LBS | TEMPERATURE: 97.9 F | HEART RATE: 75 BPM | SYSTOLIC BLOOD PRESSURE: 100 MMHG | HEIGHT: 64 IN | OXYGEN SATURATION: 96 % | DIASTOLIC BLOOD PRESSURE: 80 MMHG

## 2020-02-07 PROCEDURE — G8427 DOCREV CUR MEDS BY ELIG CLIN: HCPCS | Performed by: FAMILY MEDICINE

## 2020-02-07 PROCEDURE — 3017F COLORECTAL CA SCREEN DOC REV: CPT | Performed by: FAMILY MEDICINE

## 2020-02-07 PROCEDURE — G8484 FLU IMMUNIZE NO ADMIN: HCPCS | Performed by: FAMILY MEDICINE

## 2020-02-07 PROCEDURE — 99214 OFFICE O/P EST MOD 30 MIN: CPT | Performed by: FAMILY MEDICINE

## 2020-02-07 PROCEDURE — 1036F TOBACCO NON-USER: CPT | Performed by: FAMILY MEDICINE

## 2020-02-07 PROCEDURE — G8417 CALC BMI ABV UP PARAM F/U: HCPCS | Performed by: FAMILY MEDICINE

## 2020-02-07 RX ORDER — SUMATRIPTAN 50 MG/1
50 TABLET, FILM COATED ORAL
Qty: 27 TABLET | Refills: 1 | Status: CANCELLED | OUTPATIENT
Start: 2020-02-07 | End: 2020-02-07

## 2020-02-07 RX ORDER — ALPRAZOLAM 0.5 MG/1
TABLET ORAL
Qty: 2 TABLET | Refills: 0 | Status: SHIPPED | OUTPATIENT
Start: 2020-02-07 | End: 2020-03-08

## 2020-02-07 RX ORDER — ATORVASTATIN CALCIUM 40 MG/1
40 TABLET, FILM COATED ORAL DAILY
Qty: 90 TABLET | Refills: 1 | Status: SHIPPED | OUTPATIENT
Start: 2020-02-07 | End: 2020-08-07 | Stop reason: SDUPTHER

## 2020-02-07 RX ORDER — CLOBETASOL PROPIONATE 0.5 MG/G
OINTMENT TOPICAL
Qty: 60 G | Refills: 1 | Status: SHIPPED | OUTPATIENT
Start: 2020-02-07 | End: 2020-08-07 | Stop reason: SDUPTHER

## 2020-02-07 RX ORDER — PROPRANOLOL HYDROCHLORIDE 10 MG/1
TABLET ORAL
Qty: 90 TABLET | Refills: 1 | Status: SHIPPED | OUTPATIENT
Start: 2020-02-07 | End: 2020-08-07 | Stop reason: SDUPTHER

## 2020-02-07 RX ORDER — METFORMIN HYDROCHLORIDE 500 MG/1
1000 TABLET, EXTENDED RELEASE ORAL
Qty: 180 TABLET | Refills: 1 | Status: SHIPPED | OUTPATIENT
Start: 2020-02-07 | End: 2020-07-09 | Stop reason: SDUPTHER

## 2020-02-07 NOTE — PATIENT INSTRUCTIONS
Patient Education      Patient Education        Nonalcoholic Steatohepatitis (LOMELI): Care Instructions  Your Care Instructions    Nonalcoholic steatohepatitis (LOMELI) is liver inflammation. It is caused by a buildup of fat in the liver. The fat buildup is not caused by drinking alcohol. Because of the inflammation, the liver does not work as well as it should. LOMELI is part of a group of liver diseases called nonalcoholic fatty liver disease. In these diseases, fat builds up in the liver and sometimes causes liver damage. This damage can get worse over time. Follow-up care is a key part of your treatment and safety. Be sure to make and go to all appointments, and call your doctor if you are having problems. It's also a good idea to know your test results and keep a list of the medicines you take. How can you care for yourself at home? · Stay at a healthy weight. Or if you need to, slowly get to a healthy weight. · Control your cholesterol. Talk to your doctor about ways to lower your cholesterol, if needed. You might try getting active, taking medicines, and making healthy changes to your diet. · Eat healthy foods. This includes fruits, vegetables, lean meats and dairy, and whole grains. · If you have diabetes, keep your blood sugar at your target level. · Get at least 30 minutes of exercise on most days of the week. Walking is a good choice. You also may want to do other activities, such as running, swimming, cycling, or playing tennis or team sports. · Limit alcohol, or do not drink. Alcohol can damage the liver and cause health problems. When should you call for help? Call 911 anytime you think you may need emergency care.  For example, call if:    · You have trouble breathing.     · You vomit blood or what looks like coffee grounds.    Call your doctor now or seek immediate medical care if:    · You feel very sleepy or confused.     · You have new or worse belly pain.     · You have a fever.     · There are the different types of fats? Unhealthy fats  · Saturated fat. Saturated fats are mostly in animal foods, such as meat and dairy foods. Tropical oils, such as coconut oil, palm oil, and cocoa butter, are also saturated fats. · Trans fat. Trans fats include shortening, partially hydrogenated vegetable oils, and hydrogenated vegetable oils. Trans fats are made when a liquid fat is turned into a solid fat (for example, when corn oil is made into stick margarine). They are in many processed foods, such as cookies, crackers, and snack foods. Healthy fats  · Monounsaturated fat. Monounsaturated fats are liquid at room temperature but get solid when refrigerated. Eating foods that are high in this fat may help lower your \"bad\" (LDL) cholesterol, keep your \"good\" (HDL) cholesterol level up, and lower your chances of getting coronary artery disease. This fat is found in canola oil, olive oil, peanut oil, olives, avocados, nuts, and nut butters. · Polyunsaturated fat. Polyunsaturated fats are liquid at room temperature. They are in safflower, sunflower, and corn oils. They are also the main fat in seafood. Omega-3 fatty acids are types of polyunsaturated fat. Eating fish may lower your chances of getting coronary artery disease. Fatty fish such as salmon and mackerel contain these healthy fatty acids. So do ground flaxseeds and flaxseed oil, soybeans, walnuts, and seeds. Why cut down on unhealthy fats? Eating foods that contain saturated fats can raise the LDL (\"bad\") cholesterol in your blood. Having a high level of LDL cholesterol increases your chance of hardening of the arteries (atherosclerosis), which can lead to heart disease, heart attack, and stroke. Trans fat raises the level of \"bad\" LDL cholesterol in your blood and may lower the \"good\" HDL cholesterol in your blood. Trans fat can raise your risk of heart disease, heart attack, and stroke.   In general:  · No more than 10% of your daily calories should come from saturated fat. This is about 20 grams in a 2,000-calorie diet. · No more than 10% of your daily calories should come from polyunsaturated fat. This is about 20 grams in a 2,000-calorie diet. · Monounsaturated fats can be up to 15% of your daily calories. This is about 25 to 30 grams in a 2,000-calorie diet. If you're not sure how much fat you should be eating or how many calories you need each day to stay at a healthy weight, talk to a registered dietitian. He or she can help you create a plan that's right for you. What can you do to cut down on fat? Foods like cheese, butter, sausage, and desserts can have a lot of unhealthy fats. Try these tips for healthier meals at home and when you eat out. At home  · Fill up on fruits, vegetables, and whole grains. · Think of meat as a side dish instead of as the main part of your meal.  · When you do eat meat, make it extra-lean ground beef (97% lean), ground turkey breast (without skin added), meats with fat trimmed off before cooking, or skinless chicken. · Try main dishes that use whole wheat pasta, brown rice, dried beans, or vegetables. · Use cooking methods that use little or no fat, such as broiling, steaming, or grilling. Use cooking spray instead of oil. If you use oil, use a monounsaturated oil, such as canola or olive oil. · Read food labels on canned, bottled, or packaged foods. Choose those with little saturated fat and no trans fat. When eating out at a restaurant  · Order foods that are broiled or poached instead of fried or breaded. · Cut back on the amount of butter or margarine that you use on bread. Use small amounts of olive oil instead. · Order sauces, gravies, and salad dressings on the side, and use only a little. · When you order pasta, choose tomato sauce instead of cream sauce. · Ask for salsa with your baked potato instead of sour cream, butter, cheese, or meyer. Where can you learn more?   Go to

## 2020-02-08 ENCOUNTER — HOSPITAL ENCOUNTER (OUTPATIENT)
Dept: MRI IMAGING | Age: 50
Discharge: HOME | End: 2020-02-08
Payer: COMMERCIAL

## 2020-02-08 PROCEDURE — A9581 GADOXETATE DISODIUM INJ: HCPCS | Performed by: FAMILY MEDICINE

## 2020-02-08 PROCEDURE — 74183 MRI ABD W/O CNTR FLWD CNTR: CPT

## 2020-02-08 PROCEDURE — 6360000004 HC RX CONTRAST MEDICATION: Performed by: FAMILY MEDICINE

## 2020-02-08 RX ADMIN — GADOXETATE DISODIUM 10 ML: 181.43 INJECTION, SOLUTION INTRAVENOUS at 09:57

## 2020-02-21 LAB — DIABETIC RETINOPATHY: NEGATIVE

## 2020-02-25 RX ORDER — ESCITALOPRAM OXALATE 10 MG/1
TABLET ORAL
Qty: 90 TABLET | Refills: 1 | Status: SHIPPED | OUTPATIENT
Start: 2020-02-25 | End: 2020-08-07 | Stop reason: SDUPTHER

## 2020-03-13 ENCOUNTER — E-VISIT (OUTPATIENT)
Dept: FAMILY MEDICINE CLINIC | Age: 50
End: 2020-03-13
Payer: COMMERCIAL

## 2020-03-13 PROCEDURE — 99421 OL DIG E/M SVC 5-10 MIN: CPT | Performed by: FAMILY MEDICINE

## 2020-03-13 RX ORDER — DOXYCYCLINE HYCLATE 100 MG/1
100 CAPSULE ORAL 2 TIMES DAILY
Qty: 20 CAPSULE | Refills: 0 | Status: SHIPPED | OUTPATIENT
Start: 2020-03-13 | End: 2020-03-23

## 2020-03-13 ASSESSMENT — LIFESTYLE VARIABLES: SMOKING_STATUS: NO, I'VE NEVER SMOKED

## 2020-07-08 ENCOUNTER — PATIENT MESSAGE (OUTPATIENT)
Dept: FAMILY MEDICINE CLINIC | Age: 50
End: 2020-07-08

## 2020-07-09 RX ORDER — METFORMIN HYDROCHLORIDE 500 MG/1
1000 TABLET, EXTENDED RELEASE ORAL
Qty: 180 TABLET | Refills: 1 | Status: SHIPPED | OUTPATIENT
Start: 2020-07-09 | End: 2020-12-02 | Stop reason: SDUPTHER

## 2020-07-09 NOTE — TELEPHONE ENCOUNTER
From: Veronica Cisneros  To: Fiona Gregorio MD  Sent: 7/8/2020 6:17 PM EDT  Subject: Prescription Question    Hi Dr. Wilver Palmer - I received a letter from [employer] indicating that there is a recall for Metformin impacting my prescription. What is your recommended course of action?     Thanks,  Marianela Marie

## 2020-08-07 ENCOUNTER — TELEPHONE (OUTPATIENT)
Dept: FAMILY MEDICINE CLINIC | Age: 50
End: 2020-08-07

## 2020-08-07 ENCOUNTER — OFFICE VISIT (OUTPATIENT)
Dept: FAMILY MEDICINE CLINIC | Age: 50
End: 2020-08-07
Payer: COMMERCIAL

## 2020-08-07 VITALS
HEART RATE: 73 BPM | TEMPERATURE: 97.2 F | RESPIRATION RATE: 10 BRPM | DIASTOLIC BLOOD PRESSURE: 74 MMHG | BODY MASS INDEX: 36.37 KG/M2 | HEIGHT: 64 IN | SYSTOLIC BLOOD PRESSURE: 118 MMHG | WEIGHT: 213 LBS | OXYGEN SATURATION: 100 %

## 2020-08-07 LAB — HBA1C MFR BLD: 5.5 %

## 2020-08-07 PROCEDURE — 99214 OFFICE O/P EST MOD 30 MIN: CPT | Performed by: FAMILY MEDICINE

## 2020-08-07 PROCEDURE — G8427 DOCREV CUR MEDS BY ELIG CLIN: HCPCS | Performed by: FAMILY MEDICINE

## 2020-08-07 PROCEDURE — G8417 CALC BMI ABV UP PARAM F/U: HCPCS | Performed by: FAMILY MEDICINE

## 2020-08-07 PROCEDURE — 1036F TOBACCO NON-USER: CPT | Performed by: FAMILY MEDICINE

## 2020-08-07 PROCEDURE — 3017F COLORECTAL CA SCREEN DOC REV: CPT | Performed by: FAMILY MEDICINE

## 2020-08-07 PROCEDURE — 83036 HEMOGLOBIN GLYCOSYLATED A1C: CPT | Performed by: FAMILY MEDICINE

## 2020-08-07 RX ORDER — PROPRANOLOL HYDROCHLORIDE 10 MG/1
TABLET ORAL
Qty: 90 TABLET | Refills: 1 | Status: SHIPPED | OUTPATIENT
Start: 2020-08-07 | End: 2021-05-17 | Stop reason: SDUPTHER

## 2020-08-07 RX ORDER — CLOBETASOL PROPIONATE 0.5 MG/G
OINTMENT TOPICAL
Qty: 60 G | Refills: 1 | Status: SHIPPED | OUTPATIENT
Start: 2020-08-07 | End: 2020-08-13 | Stop reason: CLARIF

## 2020-08-07 RX ORDER — ESCITALOPRAM OXALATE 10 MG/1
TABLET ORAL
Qty: 90 TABLET | Refills: 1 | Status: SHIPPED | OUTPATIENT
Start: 2020-08-07 | End: 2020-12-31

## 2020-08-07 RX ORDER — ATORVASTATIN CALCIUM 40 MG/1
40 TABLET, FILM COATED ORAL DAILY
Qty: 90 TABLET | Refills: 1 | Status: SHIPPED | OUTPATIENT
Start: 2020-08-07 | End: 2021-01-12

## 2020-08-07 RX ORDER — FLUOCINONIDE 1 MG/G
CREAM TOPICAL
Qty: 120 G | Refills: 0 | Status: SHIPPED | OUTPATIENT
Start: 2020-08-07

## 2020-08-07 RX ORDER — CLOBETASOL PROPIONATE 0.5 MG/G
CREAM TOPICAL
Qty: 45 G | Refills: 1 | Status: SHIPPED | OUTPATIENT
Start: 2020-08-07

## 2020-08-07 ASSESSMENT — PATIENT HEALTH QUESTIONNAIRE - PHQ9
1. LITTLE INTEREST OR PLEASURE IN DOING THINGS: 0
2. FEELING DOWN, DEPRESSED OR HOPELESS: 0
SUM OF ALL RESPONSES TO PHQ QUESTIONS 1-9: 0
SUM OF ALL RESPONSES TO PHQ QUESTIONS 1-9: 0
SUM OF ALL RESPONSES TO PHQ9 QUESTIONS 1 & 2: 0

## 2020-08-07 ASSESSMENT — ENCOUNTER SYMPTOMS
COUGH: 0
BLOOD IN STOOL: 0
DIARRHEA: 0
ABDOMINAL PAIN: 0
ABDOMINAL DISTENTION: 0
SHORTNESS OF BREATH: 0
CONSTIPATION: 0
NAUSEA: 0

## 2020-08-07 NOTE — PROGRESS NOTES
Subjective:      Patient ID: Veronica Cisneros 44 y.o. female. The primary encounter diagnosis was Prediabetes. Diagnoses of Fatty liver and Pure hypercholesterolemia were also pertinent to this visit. HPI    PAP per Dr. Richelle Ku - is overdue. She will schedule:    Hyperlipidemia:  No new myalgias or GI upset on atorvastatin (Lipitor). Medication compliance: compliant most of the time. Patient is  following a low fat, low cholesterol diet. She is not exercising regularly. She had appt with Dr. Khadar Humphries for fatty liver but never saw him due to [place]. MRI last February showed fatty liver, possible mass verse nodular hyperplasia. She has no GI complaints. She is eating better. Is walker few times a week the past few weeks. Weight is down a few lbs. She has had a busy 6 mos with loss of her father at age 78. She is working to clear up her dad's house.      Lab Results   Component Value Date    CHOL 137 08/05/2020    TRIG 191 (H) 08/05/2020    HDL 36 (L) 08/05/2020    LDLCALC 63 08/05/2020     Lab Results   Component Value Date    ALT 32 08/05/2020    AST 30 08/05/2020        Labs Renal Latest Ref Rng & Units 8/5/2020 2/4/2020 7/10/2019 4/10/2019 4/1/2017   BUN 7 - 20 mg/dL 10 8 10 8 9   Cr 0.6 - 1.1 mg/dL 0.7 0.6 0.5(L) 0.6 0.5(L)   K 3.5 - 5.1 mmol/L 5.2(H) 4.6 4.3 4.9 4.9   Na 136 - 145 mmol/L 140 138 138 136 142     Lab Results   Component Value Date    LABA1C 5.7 02/04/2020     Lab Results   Component Value Date    .9 02/04/2020     TSH   Date Value Ref Range Status   04/10/2019 3.03 0.27 - 4.20 uIU/mL Final   04/01/2017 2.23 0.27 - 4.20 uIU/mL Final   06/24/2016 2.98 0.27 - 4.20 uIU/mL Final   11/02/2012 3.05 0.35 - 5.5 uIU/ml Final   09/23/2011 3.42 0.35 - 5.5 uIU/ml Final   09/09/2011 2.63 0.35 - 5.5 uIU/ml Final     Lab Results   Component Value Date    WBC 11.6 (H) 07/10/2019    HGB 12.7 07/10/2019    HCT 39.2 07/10/2019    MCV 82.1 07/10/2019     07/10/2019     Lab Results Component Value Date    LABA1C 5.7 02/04/2020     Lab Results   Component Value Date    .9 02/04/2020        Outpatient Medications Marked as Taking for the 8/7/20 encounter (Office Visit) with Marcial Pérez MD   Medication Sig Dispense Refill    Ubrogepant (UBRELVY PO) Take by mouth as needed      metFORMIN (GLUCOPHAGE-XR) 500 MG extended release tablet Take 2 tablets by mouth daily (with breakfast) 180 tablet 1    escitalopram (LEXAPRO) 10 MG tablet TAKE 1 TABLET DAILY 90 tablet 1    clobetasol (TEMOVATE) 0.05 % ointment Apply to affected area BID PRN flares 60 g 1    propranolol (INDERAL) 10 MG tablet Take 1 tablet by mouth  every 6 hours as needed 90 tablet 1    atorvastatin (LIPITOR) 40 MG tablet Take 1 tablet by mouth daily 90 tablet 1    IRON PO Take 1 tablet by mouth daily      Melatonin 3-10 MG TABS Take by mouth      clobetasol (TEMOVATE) 0.05 % cream Apply to affected area BID PRN flares 45 g 1    Fluocinonide (VANOS) 0.1 % CREA Apply to affected area BID PRN flares 120 g 0    Calcium Carb-Cholecalciferol (CALCIUM-VITAMIN D) 500-400 MG-UNIT TABS Take 1 tablet by mouth daily      therapeutic multivitamin-minerals (THERAGRAN-M) tablet Take 1 tablet by mouth daily.  ibuprofen (ADVIL;MOTRIN) 800 MG tablet Take 800 mg by mouth every 6 hours as needed. 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 No past surgical history on file.      Family History   Problem Relation Age of Onset    Stroke Father     High Cholesterol Father     Diabetes Mother     Hypertension Mother     Coronary Art Dis Mother     Migraines Mother         complex    High Cholesterol Mother        Social History     Tobacco Use    Smoking status: Never Smoker    Smokeless tobacco: Never Used   Substance Use Topics    Alcohol use: Yes     Alcohol/week: 0.0 standard drinks     Comment: 1 per month    Drug use: No            Review of Systems  Review of Systems   Constitutional: Negative for appetite change, fatigue and unexpected weight change. Respiratory: Negative for cough and shortness of breath. Cardiovascular: Negative for chest pain, palpitations and leg swelling. Gastrointestinal: Negative for abdominal distention, abdominal pain, blood in stool, constipation, diarrhea and nausea. Endocrine: Negative for polydipsia and polyuria. Musculoskeletal: Positive for arthralgias. Negative for myalgias. Pain in the right foot laterally if she moved her foot certain ways for about 3 weeks. No sxs x 1 week. No trauma or injury. Neurological: Negative for headaches. Objective:   Physical Exam  Vitals:    08/07/20 1418   BP: 118/74   Pulse: 73   Resp: 10   Temp: 97.2 °F (36.2 °C)   TempSrc: Temporal   SpO2: 100%   Weight: 213 lb (96.6 kg)   Height: 5' 4\" (1.626 m)     Wt Readings from Last 3 Encounters:   08/07/20 213 lb (96.6 kg)   02/07/20 217 lb (98.4 kg)   07/12/19 222 lb (100.7 kg)        Physical Exam  NAD    Skin is warm and dry. No rash. Well hydrated  Alert and oriented x 3. Mood and affect are normal.  The neck is supple and free of adenopathy or masses, the thyroid is normal without enlargement or nodules. Chest: clear with no wheezes or rales. No retractions, or use of accessory muscles noted. Cardiovascular: PMI is not displaced, and no thrill noted. Regular rate and rhythm with no rub, murmur or gallop. No peripheral edema. The abdomen is soft without tenderness, guarding, mass, rebound or organomegaly. Aorta, femoral, DP and PT pulses intact. Small bunion right foot.   No foot tenderness, bruising, swelling. Assessment:       Diagnosis Orders   1. Prediabetes  Discussed diet, exercise and weight loss strategies  HGA1C    2. Fatty liver  atorvastatin (LIPITOR) 40 MG tablet  LDL at goal < 100  Tolerating statin  LFTs back to normal   Discussed diet, exercise and weight loss strategies   Monitor every 6 mos. 3. Pure hypercholesterolemia  atorvastatin (LIPITOR) 40 MG tablet  LDL at goal < 100  Tolerating statin   4. Generalized anxiety disorder  escitalopram (LEXAPRO) 10 MG tablet  Well controlled   5. Palpitations  propranolol (INDERAL) 10 MG tablet   6. Abnormal MRI, liver  Repeat MRI   7. PAP per Dr. Yudy Box  8. Shingrix when available. Plan:      Side effects of current medications reviewed and questions answered. Follow up in 6 months or prn.

## 2020-08-13 ENCOUNTER — TELEPHONE (OUTPATIENT)
Dept: FAMILY MEDICINE CLINIC | Age: 50
End: 2020-08-13

## 2020-08-13 RX ORDER — HALOBETASOL PROPIONATE 0.05 %
OINTMENT (GRAM) TOPICAL
Qty: 50 G | Refills: 0 | Status: SHIPPED | OUTPATIENT
Start: 2020-08-13

## 2020-08-14 NOTE — TELEPHONE ENCOUNTER
PA submitted via Count includes the [facility] for Halobetasol Propionate 0.05% ointment.   Key: [id number]     STATUS: AWAITING CLINICAL QUESTIONS

## 2020-08-18 NOTE — TELEPHONE ENCOUNTER
Per CMM: [facility] has indicated that it is too soon to refill this medication at the pharmacy for your patient. [place] and spoke to Poonam. who told me that there was a paid claim for Halobetasol Propionate 0.05% ointment on 8/13/2020. No PA was required.

## 2020-09-19 ENCOUNTER — HOSPITAL ENCOUNTER (OUTPATIENT)
Dept: MRI IMAGING | Age: 50
Discharge: HOME | End: 2020-09-19
Payer: COMMERCIAL

## 2020-09-19 PROCEDURE — A9581 GADOXETATE DISODIUM INJ: HCPCS | Performed by: FAMILY MEDICINE

## 2020-09-19 PROCEDURE — 6360000004 HC RX CONTRAST MEDICATION: Performed by: FAMILY MEDICINE

## 2020-09-19 PROCEDURE — 74183 MRI ABD W/O CNTR FLWD CNTR: CPT

## 2020-09-19 RX ADMIN — GADOXETATE DISODIUM 10 ML: 181.43 INJECTION, SOLUTION INTRAVENOUS at 10:48

## 2020-09-22 PROBLEM — K76.89 FOCAL NODULAR HYPERPLASIA OF LIVER: Status: ACTIVE | Noted: 2020-09-22

## 2020-11-09 ENCOUNTER — OFFICE VISIT (OUTPATIENT)
Dept: DERMATOLOGY | Age: 50
End: 2020-11-09
Payer: COMMERCIAL

## 2020-11-09 VITALS — TEMPERATURE: 97.9 F

## 2020-11-09 PROCEDURE — G8484 FLU IMMUNIZE NO ADMIN: HCPCS | Performed by: DERMATOLOGY

## 2020-11-09 PROCEDURE — 1036F TOBACCO NON-USER: CPT | Performed by: DERMATOLOGY

## 2020-11-09 PROCEDURE — 3017F COLORECTAL CA SCREEN DOC REV: CPT | Performed by: DERMATOLOGY

## 2020-11-09 PROCEDURE — G8427 DOCREV CUR MEDS BY ELIG CLIN: HCPCS | Performed by: DERMATOLOGY

## 2020-11-09 PROCEDURE — 99213 OFFICE O/P EST LOW 20 MIN: CPT | Performed by: DERMATOLOGY

## 2020-11-09 PROCEDURE — G8417 CALC BMI ABV UP PARAM F/U: HCPCS | Performed by: DERMATOLOGY

## 2020-11-09 NOTE — PROGRESS NOTES
[facility]  Alicia Ordoñez MD  [phone]      Osman Garcia  1970    44 y.o. female     Date of Visit: 11/9/2020    Chief Complaint: moles, rash  Chief Complaint   Patient presents with    Skin Exam     tbse     Last seen: ~ 1 year ago    History of Present Illness:    *Her mom passed away in fall of 2016. Dad passed away in May 2020.    1, 2. Here for evaluation of multiple asx pigmented lesions on the trunk and extremities, present for many years; no change in size/shape/color of any lesions; no bleeding lesions. She has a papule on the back - asx. Family hx of melanoma. 3. F/u dermatitis - previously on the shin/legs, hands and scalp. rx'd clobetasol in the past - this helps. No flares recently. No personal hx of skin cancer. Father with hx of melanoma. Wears sunscreen regularly. No tanning bed use. Review of Systems:  Gen: Feels well, good sense of health. Skin: No changing moles or lesions. Past Medical History, Family History, Surgical History, Medications and Allergies reviewed. No past medical history on file. No past surgical history on file. Outpatient Medications Marked as Taking for the 11/9/20 encounter (Office Visit) with Benton Ramos MD   Medication Sig Dispense Refill    halobetasol (ULTRAVATE) 0.05 % ointment Apply topically 2 times daily.  50 g 0    escitalopram (LEXAPRO) 10 MG tablet TAKE 1 TABLET DAILY 90 tablet 1    propranolol (INDERAL) 10 MG tablet Take 1 tablet by mouth  every 6 hours as needed 90 tablet 1    atorvastatin (LIPITOR) 40 MG tablet Take 1 tablet by mouth daily 90 tablet 1    Ubrogepant (UBRELVY PO) Take by mouth as needed      Fluocinonide (VANOS) 0.1 % CREA Apply to affected area BID PRN flares 120 g 0    clobetasol (TEMOVATE) 0.05 % cream Apply to affected area BID PRN flares 45 g 1    metFORMIN (GLUCOPHAGE-XR) 500 MG extended release tablet Take 2 tablets by mouth daily (with breakfast) 180 tablet 1    IRON PO Take 1 tablet by mouth daily      Melatonin 3-10 MG TABS Take by mouth      Calcium Carb-Cholecalciferol (CALCIUM-VITAMIN D) 500-400 MG-UNIT TABS Take 1 tablet by mouth daily      therapeutic multivitamin-minerals (THERAGRAN-M) tablet Take 1 tablet by mouth daily.  ibuprofen (ADVIL;MOTRIN) 800 MG tablet Take 800 mg by mouth every 6 hours as needed. Allergies   Allergen Reactions    Demerol     Augmentin [Amoxicillin-Pot Clavulanate] Rash         Physical Examination     Gen, well-appearing  The following were examined and determined to be normal: Psych/Neuro, Scalp/hair, Head/face, Conjunctivae/eyelids, Gums/teeth/lips, Neck, Breast/axilla/chest, Back, LLE, Nails/digits. , buttocks, RUE, LUE, abdomen  The following were examined and determined to be abnormal: RLE  trunk and extremities with scattered brown macules and papules   Lower back with ~ 1 cm cystic nodule with punctum  Legs clear    Previous flare:          Assessment and Plan     1. Benign-appearing nevi and SK's and epidermoid cyst - lower back  2. Family hx of melanoma  - educ re ABCD's of MM   educ sun protection   encouraged skin check yearly (sooner if indicated), self checks    3. dermatitis - clear today  - clobetasol cream bid until clear and prn flares; ed se/misuse    F/u 1-2 years for FSE.

## 2020-12-02 RX ORDER — METFORMIN HYDROCHLORIDE 500 MG/1
1000 TABLET, EXTENDED RELEASE ORAL
Qty: 180 TABLET | Refills: 1 | Status: SHIPPED | OUTPATIENT
Start: 2020-12-02 | End: 2021-05-14

## 2020-12-02 NOTE — TELEPHONE ENCOUNTER
Last OV 8/7/20  Please advise  Thank you      Ref.  Range 8/5/2020 07:34   Cholesterol, Total Latest Ref Range: 0 - 199 mg/dL 137   HDL Cholesterol Latest Ref Range: 40 - 60 mg/dL 36 (L)   LDL Calculated Latest Ref Range: <100 mg/dL 63   Triglycerides Latest Ref Range: 0 - 150 mg/dL 191 (H)   VLDL Cholesterol Calculated Latest Ref Range: Not Established mg/dL 38

## 2020-12-31 RX ORDER — ESCITALOPRAM OXALATE 10 MG/1
TABLET ORAL
Qty: 90 TABLET | Refills: 1 | Status: SHIPPED | OUTPATIENT
Start: 2020-12-31 | End: 2021-05-25

## 2020-12-31 NOTE — TELEPHONE ENCOUNTER
Requested Prescriptions     Pending Prescriptions Disp Refills    escitalopram (LEXAPRO) 10 MG tablet [Pharmacy Med Name: ESCITALOPRAM TAB 10MG] 90 tablet 1     Sig: TAKE 1 TABLET DAILY     Last ov 8/7/20  Last labs 8/5/20

## 2021-01-08 DIAGNOSIS — K76.0 FATTY LIVER: ICD-10-CM

## 2021-01-08 DIAGNOSIS — E78.00 PURE HYPERCHOLESTEROLEMIA: ICD-10-CM

## 2021-01-12 RX ORDER — ATORVASTATIN CALCIUM 40 MG/1
TABLET, FILM COATED ORAL
Qty: 90 TABLET | Refills: 1 | Status: SHIPPED | OUTPATIENT
Start: 2021-01-12 | End: 2021-05-17 | Stop reason: SDUPTHER

## 2021-03-08 ENCOUNTER — PATIENT MESSAGE (OUTPATIENT)
Dept: FAMILY MEDICINE CLINIC | Age: 51
End: 2021-03-08

## 2021-03-08 NOTE — TELEPHONE ENCOUNTER
From: Gael Joseph  To: Alisia Smith MD  Sent: 3/8/2021 2:27 PM EST  Subject: Non-Urgent [place] Re Dr. Dennis So,    Is your office doing COVID vaccines or do I need to go through [facility]? I saw they will be expanding eligibility Thursday to 50+ and I have an appointment with you in a couple of weeks. If I can get it at the office I will hold off exploring other channels. If not, I will start looking at alternatives.     Thanks,  Jose Olmstead

## 2021-03-19 ENCOUNTER — OFFICE VISIT (OUTPATIENT)
Dept: FAMILY MEDICINE CLINIC | Age: 51
End: 2021-03-19
Payer: COMMERCIAL

## 2021-03-19 VITALS
BODY MASS INDEX: 37.76 KG/M2 | SYSTOLIC BLOOD PRESSURE: 129 MMHG | OXYGEN SATURATION: 95 % | DIASTOLIC BLOOD PRESSURE: 76 MMHG | HEART RATE: 81 BPM | WEIGHT: 220 LBS | RESPIRATION RATE: 16 BRPM | TEMPERATURE: 97.3 F

## 2021-03-19 DIAGNOSIS — Z11.59 ENCOUNTER FOR HEPATITIS C SCREENING TEST FOR LOW RISK PATIENT: ICD-10-CM

## 2021-03-19 DIAGNOSIS — M72.2 PLANTAR FASCIITIS: ICD-10-CM

## 2021-03-19 DIAGNOSIS — K76.89 FOCAL NODULAR HYPERPLASIA OF LIVER: ICD-10-CM

## 2021-03-19 DIAGNOSIS — Z00.00 WELL ADULT EXAM: ICD-10-CM

## 2021-03-19 DIAGNOSIS — F41.0 PANIC DISORDER WITHOUT AGORAPHOBIA: ICD-10-CM

## 2021-03-19 DIAGNOSIS — F41.1 ANXIETY STATE: ICD-10-CM

## 2021-03-19 DIAGNOSIS — E78.00 PURE HYPERCHOLESTEROLEMIA: ICD-10-CM

## 2021-03-19 DIAGNOSIS — R73.03 PREDIABETES: Primary | ICD-10-CM

## 2021-03-19 PROCEDURE — G8427 DOCREV CUR MEDS BY ELIG CLIN: HCPCS | Performed by: FAMILY MEDICINE

## 2021-03-19 PROCEDURE — 3017F COLORECTAL CA SCREEN DOC REV: CPT | Performed by: FAMILY MEDICINE

## 2021-03-19 PROCEDURE — 1036F TOBACCO NON-USER: CPT | Performed by: FAMILY MEDICINE

## 2021-03-19 PROCEDURE — G8484 FLU IMMUNIZE NO ADMIN: HCPCS | Performed by: FAMILY MEDICINE

## 2021-03-19 PROCEDURE — G8417 CALC BMI ABV UP PARAM F/U: HCPCS | Performed by: FAMILY MEDICINE

## 2021-03-19 PROCEDURE — 99214 OFFICE O/P EST MOD 30 MIN: CPT | Performed by: FAMILY MEDICINE

## 2021-03-19 ASSESSMENT — PATIENT HEALTH QUESTIONNAIRE - PHQ9
SUM OF ALL RESPONSES TO PHQ9 QUESTIONS 1 & 2: 0
SUM OF ALL RESPONSES TO PHQ QUESTIONS 1-9: 0

## 2021-03-19 NOTE — PROGRESS NOTES
Subjective:      Patient ID: Madina Armendariz 44 y.o. female. The primary encounter diagnosis was Prediabetes. Diagnoses of Focal nodular hyperplasia of liver, Anxiety state, Pure hypercholesterolemia, Panic disorder without agoraphobia, Encounter for hepatitis C screening test for low risk patient, and Well adult exam were also pertinent to this visit. HPI    Health maintenance: due COVID, shingles and flu vaccines. Due hepatitis C screen. Complains of pain in her feet x one months. Feel sore like she has been standing on a hard surface all the time. She has tenderness at the sides of the feet if she bangs in to things. She was walking in her bare feet all the time. Is better since she started to wear tennis shoes at home. She has numbness in the feet on and off; no sxs today. The outside of the feet feel like they have fallen asleep. They tingle. Are worse at the end of the day. YUDI and panic disorder: treated with Lexapro. She is not sure if the Lexapro is helping or not. She is functioning well. The winter was hard with isolation. She was able to go to [facility] and rent a house in February with her sister; this helped a lot. She saw a therapist in college but not since. She is wondering what post-COVID, post death of her father will look look. Sleep is better but still has trouble falling to sleep and trouble staying asleep. She does try to do meditation. Would like to try to get off the Lexapro at some time. Pre-diabetes:  Treated with diet, exercise and Metformin. Last HGA1C was in the normal range. Is eating well most of the time. Is trying to get 10,000 steps daily. occ dose resistance training. No polyuria, polydipsia, blurred vision. Fatty liver/nodular hyperplasia:  MRI of the liver last fall was improved and last liver enzymes were normal. Dr. Tiffani Arredondo wants a 6 month follow up MRI. Hyperlipidemia:  No new myalgias or GI upset on atorvastatin (Lipitor). Medication compliance: compliant all of the time. Patient is  following a low fat, low cholesterol diet. She is  exercising regularly. Patient denies any exertional chest pain, dyspnea, palpitations, syncope, orthopnea, edema or paroxysmal nocturnal dyspnea. Lab Results   Component Value Date    CHOL 137 08/05/2020    TRIG 191 (H) 08/05/2020    HDL 36 (L) 08/05/2020    LDLCALC 63 08/05/2020     Lab Results   Component Value Date    ALT 32 08/05/2020    AST 30 08/05/2020        Labs Renal Latest Ref Rng & Units 8/5/2020 2/4/2020 7/10/2019 4/10/2019 4/1/2017   BUN 7 - 20 mg/dL 10 8 10 8 9   Cr 0.6 - 1.1 mg/dL 0.7 0.6 0.5(L) 0.6 0.5(L)   K 3.5 - 5.1 mmol/L 5.2(H) 4.6 4.3 4.9 4.9   Na 136 - 145 mmol/L 140 138 138 136 142     Lab Results   Component Value Date    LABA1C 5.5 08/07/2020     Lab Results   Component Value Date    .9 02/04/2020      TSH   Date Value Ref Range Status   04/10/2019 3.03 0.27 - 4.20 uIU/mL Final   04/01/2017 2.23 0.27 - 4.20 uIU/mL Final   06/24/2016 2.98 0.27 - 4.20 uIU/mL Final   11/02/2012 3.05 0.35 - 5.5 uIU/ml Final   09/23/2011 3.42 0.35 - 5.5 uIU/ml Final   09/09/2011 2.63 0.35 - 5.5 uIU/ml Final     Lab Results   Component Value Date    WBC 11.6 (H) 07/10/2019    HGB 12.7 07/10/2019    HCT 39.2 07/10/2019    MCV 82.1 07/10/2019     07/10/2019      Outpatient Medications Marked as Taking for the 3/19/21 encounter (Office Visit) with Damien Cali MD   Medication Sig Dispense Refill    atorvastatin (LIPITOR) 40 MG tablet TAKE 1 TABLET DAILY 90 tablet 1    escitalopram (LEXAPRO) 10 MG tablet TAKE 1 TABLET DAILY 90 tablet 1    metFORMIN (GLUCOPHAGE-XR) 500 MG extended release tablet Take 2 tablets by mouth daily (with breakfast) 180 tablet 1    halobetasol (ULTRAVATE) 0.05 % ointment Apply topically 2 times daily.  50 g 0    propranolol (INDERAL) 10 MG tablet Take 1 tablet by mouth  every 6 hours as needed 90 tablet 1    Ubrogepant (UBRELVY PO) Take by mouth as needed      Fluocinonide (VANOS) 0.1 % CREA Apply to affected area BID PRN flares 120 g 0    clobetasol (TEMOVATE) 0.05 % cream Apply to affected area BID PRN flares 45 g 1    IRON PO Take 1 tablet by mouth daily      Melatonin 3-10 MG TABS Take by mouth      Calcium Carb-Cholecalciferol (CALCIUM-VITAMIN D) 500-400 MG-UNIT TABS Take 1 tablet by mouth daily      therapeutic multivitamin-minerals (THERAGRAN-M) tablet Take 1 tablet by mouth daily.  ibuprofen (ADVIL;MOTRIN) 800 MG tablet Take 800 mg by mouth every 6 hours as needed. 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 No past surgical history on file. Family History   Problem Relation Age of Onset    Stroke Father     High Cholesterol Father     Diabetes Mother     Hypertension Mother     Coronary Art Dis Mother    [employer] Migraines Mother         complex    High Cholesterol Mother        Social History     Tobacco Use    Smoking status: Never Smoker    Smokeless tobacco: Never Used   Substance Use Topics    Alcohol use: Yes     Alcohol/week: 0.0 standard drinks     Comment: 1 per month    Drug use: No            Review of Systems  Review of Systems    Objective:   Physical Exam  Vitals:    03/19/21 1525   BP: 129/76   Pulse: 81   Resp: 16   Temp: 97.3 °F (36.3 °C)   TempSrc: Temporal   SpO2: 95%   Weight: 220 lb (99.8 kg)     Wt Readings from Last 3 Encounters:   03/19/21 220 lb (99.8 kg)   08/07/20 213 lb (96.6 kg)   02/07/20 217 lb (98.4 kg)        Physical Exam    NAD    Skin is warm and dry. No rash.  Well hydrated

## 2021-03-19 NOTE — PATIENT INSTRUCTIONS
Patient Education      Patient Education        Plantar Fasciitis: Exercises  Introduction  Here are some examples of exercises for you to try. The exercises may be suggested for a condition or for rehabilitation. Start each exercise slowly. Ease off the exercises if you start to have pain. You will be told when to start these exercises and which ones will work best for you. How to do the exercises  Towel stretch   1. Sit with your legs extended and knees straight. 2. Place a towel around your foot just under the toes. 3. Hold each end of the towel in each hand, with your hands above your knees. 4. Pull back with the towel so that your foot stretches toward you. 5. Hold the position for at least 15 to 30 seconds. 6. Repeat 2 to 4 times a session, up to 5 sessions a day. Calf stretch   This exercise stretches the muscles at the back of the lower leg (the calf) and the Achilles tendon. Do this exercise 3 or 4 times a day, 5 days a week. 1. Stand facing a wall with your hands on the wall at about eye level. Put the leg you want to stretch about a step behind your other leg. 2. Keeping your back heel on the floor, bend your front knee until you feel a stretch in the back leg. 3. Hold the stretch for 15 to 30 seconds. Repeat 2 to 4 times. Plantar fascia and calf stretch   Stretching the plantar fascia and calf muscles can increase flexibility and decrease heel pain. You can do this exercise several times each day and before and after activity. 1. Stand on a step as shown above. Be sure to hold on to the banister. 2. Slowly let your heels down over the edge of the step as you relax your calf muscles. You should feel a gentle stretch across the bottom of your foot and up the back of your leg to your knee. 3. Hold the stretch about 15 to 30 seconds, and then tighten your calf muscle a little to bring your heel back up to the level of the step. Repeat 2 to 4 times.     Towel curls   Make this exercise more challenging by placing a weighted object, such as a soup can, on the other end of the towel. 1. While sitting, place your foot on a towel on the floor and scrunch the towel toward you with your toes. 2. Then, also using your toes, push the towel away from you. [place] pickups   1. Put marbles on the floor next to a cup.  2. Using your toes, try to lift the marbles up from the floor and put them in the cup. Follow-up care is a key part of your treatment and safety. Be sure to make and go to all appointments, and call your doctor if you are having problems. It's also a good idea to know your test results and keep a list of the medicines you take. Where can you learn more? Go to https://Gold Lassokevoneb.Pwn[employer] Express. org and sign in to your [employer] account. Enter S640 in the D[employer].net box to learn more about \"Plantar Fasciitis: Exercises. \"     If you do not have an account, please click on the \"Sign Up Now\" link. Current as of: November 16, 2020               Content Version: 12.8  © 2006-2021 [employer]. Care instructions adapted under license by [facility] ([facility]). If you have questions about a medical condition or this instruction, always ask your healthcare professional. [address] any warranty or liability for your use of this information. Plantar Fasciitis: Care Instructions  Overview     Plantar fasciitis is pain and inflammation of the plantar fascia, the tissue at the bottom of your foot that connects the heel bone to the toes. The plantar fascia also supports the arch. If you strain the plantar fascia, it can develop small tears and cause heel pain when you stand or walk. Plantar fasciitis can be caused by running or other sports. It also may occur in people who are overweight or who have high arches or flat feet. You may get plantar fasciitis if you walk or stand for long periods, or have a tight Achilles tendon or calf muscles.   You can improve your foot pain with rest and other care at home. It might take a few weeks to a few months for your foot to heal completely. Follow-up care is a key part of your treatment and safety. Be sure to make and go to all appointments, and call your doctor if you are having problems. It's also a good idea to know your test results and keep a list of the medicines you take. How can you care for yourself at home? · Rest your feet often. Reduce your activity to a level that lets you avoid pain. If possible, do not run or walk on hard surfaces. · Take pain medicines exactly as directed. ? If the doctor gave you a prescription medicine for pain, take it as prescribed. ? If you are not taking a prescription pain medicine, take an over-the-counter anti-inflammatory medicine for pain and swelling, such as ibuprofen (Advil, Motrin) or naproxen (Aleve). Read and follow all instructions on the label. · Use ice massage to help with pain and swelling. You can use an ice cube or an ice cup several times a day. To make an ice cup, fill a paper cup with water and freeze it. Cut off the top of the cup until a half-inch of ice shows. Hold onto the remaining paper to use the cup. Rub the ice in small circles over the area for 5 to 7 minutes. · Contrast baths, which alternate hot and cold water, can also help reduce swelling. But because heat alone may make pain and swelling worse, end a contrast bath with a soak in cold water. · Wear a night splint if your doctor suggests it. A night splint holds your foot with the toes pointed up and the foot and ankle at a 90-degree angle. This position gives the bottom of your foot a constant, gentle stretch. · Do simple exercises such as calf stretches and towel stretches 2 to 3 times each day, especially when you first get up in the morning. These can help the plantar fascia become more flexible. They also make the muscles that support your arch stronger.  Hold these stretches for 15 to 30 seconds per stretch. Repeat 2 to 4 times. ? Stand about 1 foot from a wall. Place the palms of both hands against the wall at chest level. Lean forward against the wall, keeping one leg with the knee straight and heel on the ground while bending the knee of the other leg.  ? Sit down on the floor or a mat with your feet stretched in front of you. Roll up a towel lengthwise, and loop it over the ball of your foot. Holding the towel at both ends, gently pull the towel toward you to stretch your foot. · Wear shoes with good arch support. Athletic shoes or shoes with a well-cushioned sole are good choices. · Replace athletic shoes regularly. · Try heel cups or shoe inserts (orthotics) to help cushion your heel. You can buy these at many shoe stores. · Put on your shoes as soon as you get out of bed. Going barefoot or wearing slippers may make your pain worse. · Reach and stay at a good weight for your height. This puts less strain on your feet. When should you call for help? Call your doctor now or seek immediate medical care if:    · You have heel pain with fever, redness, or warmth in your heel.     · You cannot put weight on the sore foot. Watch closely for changes in your health, and be sure to contact your doctor if:    · You have numbness or tingling in your heel.     · Your heel pain lasts more than 2 weeks. Where can you learn more? Go to https://IkariapeIronPearl.mechatronic [email or web address] org and sign in to your [employer] account. Enter Y139 in the [facility] box to learn more about \"Plantar Fasciitis: Care Instructions. \"     If you do not have an account, please click on the \"Sign Up Now\" link. Current as of: November 16, 2020               Content Version: 12.8  © 8588-9763 [employer]. Care instructions adapted under license by [facility] ([facility]).  If you have questions about a medical condition or this instruction, always ask your healthcare professional. Delisa Lara disclaims any warranty or liability for your use of this information.

## 2021-03-23 DIAGNOSIS — R73.03 PREDIABETES: ICD-10-CM

## 2021-03-23 DIAGNOSIS — Z11.59 ENCOUNTER FOR HEPATITIS C SCREENING TEST FOR LOW RISK PATIENT: ICD-10-CM

## 2021-03-23 DIAGNOSIS — E78.00 PURE HYPERCHOLESTEROLEMIA: ICD-10-CM

## 2021-03-23 DIAGNOSIS — Z00.00 WELL ADULT EXAM: ICD-10-CM

## 2021-03-23 DIAGNOSIS — K76.89 FOCAL NODULAR HYPERPLASIA OF LIVER: ICD-10-CM

## 2021-03-23 LAB
A/G RATIO: 1.2 (ref 1.1–2.2)
ALBUMIN SERPL-MCNC: 4.3 G/DL (ref 3.4–5)
ALP BLD-CCNC: 109 U/L (ref 40–129)
ALT SERPL-CCNC: 36 U/L (ref 10–40)
ANION GAP SERPL CALCULATED.3IONS-SCNC: 12 MMOL/L (ref 3–16)
AST SERPL-CCNC: 40 U/L (ref 15–37)
BILIRUB SERPL-MCNC: 1.3 MG/DL (ref 0–1)
BUN BLDV-MCNC: 9 MG/DL (ref 7–20)
CALCIUM SERPL-MCNC: 9.5 MG/DL (ref 8.3–10.6)
CHLORIDE BLD-SCNC: 101 MMOL/L (ref 99–110)
CHOLESTEROL, TOTAL: 138 MG/DL (ref 0–199)
CO2: 26 MMOL/L (ref 21–32)
CREAT SERPL-MCNC: 0.7 MG/DL (ref 0.6–1.1)
GFR AFRICAN AMERICAN: >60
GFR NON-AFRICAN AMERICAN: >60
GLOBULIN: 3.6 G/DL
GLUCOSE BLD-MCNC: 94 MG/DL (ref 70–99)
HCT VFR BLD CALC: 39.3 % (ref 36–48)
HDLC SERPL-MCNC: 32 MG/DL (ref 40–60)
HEMOGLOBIN: 13.2 G/DL (ref 12–16)
LDL CHOLESTEROL CALCULATED: 63 MG/DL
MCH RBC QN AUTO: 28.7 PG (ref 26–34)
MCHC RBC AUTO-ENTMCNC: 33.6 G/DL (ref 31–36)
MCV RBC AUTO: 85.5 FL (ref 80–100)
PDW BLD-RTO: 16 % (ref 12.4–15.4)
PLATELET # BLD: 349 K/UL (ref 135–450)
PMV BLD AUTO: 8.4 FL (ref 5–10.5)
POTASSIUM SERPL-SCNC: 4.8 MMOL/L (ref 3.5–5.1)
RBC # BLD: 4.59 M/UL (ref 4–5.2)
SODIUM BLD-SCNC: 139 MMOL/L (ref 136–145)
TOTAL PROTEIN: 7.9 G/DL (ref 6.4–8.2)
TRIGL SERPL-MCNC: 215 MG/DL (ref 0–150)
VLDLC SERPL CALC-MCNC: 43 MG/DL
WBC # BLD: 10.6 K/UL (ref 4–11)

## 2021-03-24 ENCOUNTER — PATIENT MESSAGE (OUTPATIENT)
Dept: FAMILY MEDICINE CLINIC | Age: 51
End: 2021-03-24

## 2021-03-24 LAB
ALBUMIN SERPL-MCNC: 4.6 G/DL (ref 3.4–5)
ALP BLD-CCNC: 108 U/L (ref 40–129)
ALT SERPL-CCNC: 37 U/L (ref 10–40)
AST SERPL-CCNC: 53 U/L (ref 15–37)
BILIRUB SERPL-MCNC: 1.2 MG/DL (ref 0–1)
BILIRUBIN DIRECT: <0.2 MG/DL (ref 0–0.3)
BILIRUBIN, INDIRECT: ABNORMAL MG/DL (ref 0–1)
ESTIMATED AVERAGE GLUCOSE: 102.5 MG/DL
HAV IGM SER IA-ACNC: NORMAL
HBA1C MFR BLD: 5.2 %
HBV SURFACE AB TITR SER: <3.5 MIU/ML
HEPATITIS C ANTIBODY INTERPRETATION: NORMAL
TOTAL PROTEIN: 7.6 G/DL (ref 6.4–8.2)
TSH REFLEX: 3.97 UIU/ML (ref 0.27–4.2)

## 2021-03-24 NOTE — TELEPHONE ENCOUNTER
Please advise    Hi Dr. Olga Alonzo,    Thanks for the update. I am happy with the AIC and overall cholesterol, but less happy with HDL and Triglycerides so I'll keep working on that. Regarding the liver panel, while ALT was normal the AST looks elevated. I'm guessing that is resulting from the fatty liver? And it looks like the Bilirubin is still elevated. As you advised, I did not do a fasting blood test. Do you know what would be causing that and is it a cause for concern? Dr. Julissa Morris indicated that was rather unusual per my last blood results and she thought it might be an anomaly but apparently it is not.     Thanks,  Rika Tovar

## 2021-03-26 LAB — DIABETIC RETINOPATHY: NEGATIVE

## 2021-03-27 LAB
AFP (ALPHA FETOPROTEIN) L3%: <0.5 % (ref 0–9.9)
AFP: 2 NG/ML (ref 0–15)

## 2021-04-12 ENCOUNTER — PATIENT MESSAGE (OUTPATIENT)
Dept: FAMILY MEDICINE CLINIC | Age: 51
End: 2021-04-12

## 2021-04-12 NOTE — TELEPHONE ENCOUNTER
From: Austyn Kuo  To: Ivis Natarajan MD  Sent: 4/12/2021  2:17 PM EDT  Subject: Test Results Question    Hi Dr. Maria Isabel Aranda,    I was looking at my latest lab results again and had a question - I know both you and Dr. Ernestina Fischer ordered liver panels, but it looks like some of the results from the 3/23 tests are different. The bilirubin and ALT are slightly different, but the AST seems to be dramatically different (40 vs. 53). What might cause this if they were working from the same blood draw? By the way, I got my 2nd [place] vaccination Friday and had minimal side effects. Very excited about that!     Thanks,  Shell Hunt

## 2021-04-13 ENCOUNTER — TELEPHONE (OUTPATIENT)
Dept: FAMILY MEDICINE CLINIC | Age: 51
End: 2021-04-13

## 2021-04-13 NOTE — TELEPHONE ENCOUNTER
No record of Hep A and B  Please advise  Thank you      ----- Message from Douglas Newberry sent at 4/12/2021  9:08 AM EDT -----  Subject: Message to Provider    QUESTIONS  Information for Provider? PT has an appt on 4/30 for a shingles vaccine   and they are wondering if they can also get their Hepatitis A and B   vaccine that day as well  ---------------------------------------------------------------------------  --------------  CALL BACK INFO  What is the best way for the office to contact you? OK to leave message on   voicemail   OK to respond with electronic message via [employer] portal (only for   patients who have registered [employer] account)  Preferred Call Back Phone Number? [phone]  ---------------------------------------------------------------------------  --------------  SCRIPT ANSWERS  Relationship to Patient?  Self

## 2021-04-24 ENCOUNTER — HOSPITAL ENCOUNTER (OUTPATIENT)
Dept: MRI IMAGING | Age: 51
Discharge: HOME | End: 2021-04-24
Payer: COMMERCIAL

## 2021-04-24 DIAGNOSIS — K76.89 FOCAL NODULAR HYPERPLASIA OF LIVER: ICD-10-CM

## 2021-04-24 PROCEDURE — A9581 GADOXETATE DISODIUM INJ: HCPCS | Performed by: INTERNAL MEDICINE

## 2021-04-24 PROCEDURE — 6360000004 HC RX CONTRAST MEDICATION: Performed by: INTERNAL MEDICINE

## 2021-04-24 PROCEDURE — 74183 MRI ABD W/O CNTR FLWD CNTR: CPT

## 2021-04-24 RX ADMIN — GADOXETATE DISODIUM 10 ML: 181.43 INJECTION, SOLUTION INTRAVENOUS at 15:04

## 2021-04-29 NOTE — TELEPHONE ENCOUNTER
Ok to administer combination vaccine   Hep A & B/ Twinrix or prefer separate vaccines?   Please advise  Thank you

## 2021-04-30 ENCOUNTER — NURSE ONLY (OUTPATIENT)
Dept: FAMILY MEDICINE CLINIC | Age: 51
End: 2021-04-30
Payer: COMMERCIAL

## 2021-04-30 DIAGNOSIS — Z23 NEED FOR HEPATITIS A AND B VACCINATION: ICD-10-CM

## 2021-04-30 DIAGNOSIS — Z23 NEED FOR SHINGLES VACCINE: Primary | ICD-10-CM

## 2021-04-30 PROCEDURE — 90472 IMMUNIZATION ADMIN EACH ADD: CPT | Performed by: FAMILY MEDICINE

## 2021-04-30 PROCEDURE — 90750 HZV VACC RECOMBINANT IM: CPT | Performed by: FAMILY MEDICINE

## 2021-04-30 PROCEDURE — 90471 IMMUNIZATION ADMIN: CPT | Performed by: FAMILY MEDICINE

## 2021-04-30 PROCEDURE — 90636 HEP A/HEP B VACC ADULT IM: CPT | Performed by: FAMILY MEDICINE

## 2021-04-30 NOTE — PROGRESS NOTES
Pt here for shingrix dose 1 and Twinrix/ hep A & B combination dose 1, ok per SM  Pt tolerated well.

## 2021-05-13 DIAGNOSIS — R73.03 PREDIABETES: ICD-10-CM

## 2021-05-14 RX ORDER — METFORMIN HYDROCHLORIDE 500 MG/1
TABLET, EXTENDED RELEASE ORAL
Qty: 180 TABLET | Refills: 1 | Status: SHIPPED | OUTPATIENT
Start: 2021-05-14 | End: 2021-10-06

## 2021-05-24 DIAGNOSIS — F41.1 GENERALIZED ANXIETY DISORDER: ICD-10-CM

## 2021-05-25 RX ORDER — ESCITALOPRAM OXALATE 10 MG/1
TABLET ORAL
Qty: 90 TABLET | Refills: 1 | Status: SHIPPED | OUTPATIENT
Start: 2021-05-25 | End: 2022-03-13

## 2021-05-25 NOTE — TELEPHONE ENCOUNTER
Requested Prescriptions     Pending Prescriptions Disp Refills    escitalopram (LEXAPRO) 10 MG tablet [Pharmacy Med Name: ESCITALOPRAM TAB 10MG] 90 tablet 1     Sig: TAKE 1 TABLET DAILY       LOV 3/19/2021  NOV 9/17/21  Labs 3/23/21

## 2021-06-04 ENCOUNTER — NURSE ONLY (OUTPATIENT)
Dept: FAMILY MEDICINE CLINIC | Age: 51
End: 2021-06-04
Payer: COMMERCIAL

## 2021-06-04 DIAGNOSIS — Z23 NEED FOR VACCINATION WITH TWINRIX: Primary | ICD-10-CM

## 2021-06-04 PROCEDURE — 90636 HEP A/HEP B VACC ADULT IM: CPT | Performed by: FAMILY MEDICINE

## 2021-06-04 PROCEDURE — 90471 IMMUNIZATION ADMIN: CPT | Performed by: FAMILY MEDICINE

## 2021-06-04 NOTE — PROGRESS NOTES
After obtaining consent, and per orders of Dr. Adry Fabian, injection of Twinrix given in Left deltoid by Fenton Spatz. Patient instructed to remain in clinic for 20 minutes afterwards, and to report any adverse reaction to me immediately.

## 2021-06-05 DIAGNOSIS — E78.00 PURE HYPERCHOLESTEROLEMIA: ICD-10-CM

## 2021-06-05 DIAGNOSIS — K76.0 FATTY LIVER: ICD-10-CM

## 2021-06-07 RX ORDER — ATORVASTATIN CALCIUM 40 MG/1
TABLET, FILM COATED ORAL
Qty: 90 TABLET | Refills: 1 | Status: SHIPPED | OUTPATIENT
Start: 2021-06-07 | End: 2021-11-01

## 2021-09-16 NOTE — PROGRESS NOTES
Subjective:      Patient ID: Debra Child 44 y.o. female. The primary encounter diagnosis was Prediabetes. Diagnoses of Anxiety state, Fatty liver, Focal nodular hyperplasia of liver, Pure hypercholesterolemia, and Migraine without aura and without status migrainosus, not intractable were also pertinent to this visit. HPI    Due shingles, TDAP and flu vaccines. Due 3rd Twinrix in November. Sees Dr. Danny Samson every few years; last PAP normal 2 years ago. Has gone up to 3 months without menses, occ has a heavy period. Most are very light. YUDI:  Discontinued Lexapro after last visit and is doing fine off it. Prediabetes:  Managed with metformin, diet and exercise. Is working on improved nutrition. Started back on [employer]. Exercise 6/7 days. Treadmill. Weights. Weight is down 10 lbs. She feels well. Sleeping better, still not always great. Migraines:  Infrequent. Usually Tylenol is effective. If not, the [place] is effective. Hyperlipidemia:  No new myalgias or GI upset on atorvastatin (Lipitor). Medication compliance: compliant most of the time. Patient is  following a low fat, low cholesterol diet. She is  exercising regularly. Patient denies any exertional chest pain, dyspnea, palpitations, syncope, orthopnea, edema or paroxysmal nocturnal dyspnea.      Lab Results   Component Value Date    CHOL 138 03/23/2021    TRIG 215 (H) 03/23/2021    HDL 32 (L) 03/23/2021    LDLCALC 63 03/23/2021     Lab Results   Component Value Date    ALT 36 03/23/2021    AST 40 (H) 03/23/2021         Labs Renal Latest Ref Rng & Units 3/23/2021 8/5/2020 2/4/2020 7/10/2019 4/10/2019   BUN 7 - 20 mg/dL 9 10 8 10 8   Cr 0.6 - 1.1 mg/dL 0.7 0.7 0.6 0.5(L) 0.6   K 3.5 - 5.1 mmol/L 4.8 5.2(H) 4.6 4.3 4.9   Na 136 - 145 mmol/L 139 140 138 138 136     TSH   Date Value Ref Range Status   03/23/2021 3.97 0.27 - 4.20 uIU/mL Final   04/10/2019 3.03 0.27 - 4.20 uIU/mL Final   04/01/2017 2.23 0.27 - 4.20 uIU/mL Final   11/02/2012 3.05 0.35 - 5.5 uIU/ml Final   09/23/2011 3.42 0.35 - 5.5 uIU/ml Final   09/09/2011 2.63 0.35 - 5.5 uIU/ml Final     Lab Results   Component Value Date    WBC 10.6 03/23/2021    HGB 13.2 03/23/2021    HCT 39.3 03/23/2021    MCV 85.5 03/23/2021     03/23/2021        Outpatient Medications Marked as Taking for the 9/17/21 encounter (Office Visit) with Mihaela Villeda MD   Medication Sig Dispense Refill    atorvastatin (LIPITOR) 40 MG tablet TAKE 1 TABLET DAILY 90 tablet 1    UBRELVY 50 MG TABS TAKE ONE BY MOUTH DAILY AT ONSET OF MIGRAINE REPEAT IN 2 HOURS AS NEEDED. MAX 2 TABLETS EVERY 24 HOURS. 10 tablet 5    propranolol (INDERAL) 10 MG tablet Take 1 tablet by mouth  every 6 hours as needed 90 tablet 2    metFORMIN (GLUCOPHAGE-XR) 500 MG extended release tablet TAKE 2 TABLETS DAILY WITH  BREAKFAST 180 tablet 1    halobetasol (ULTRAVATE) 0.05 % ointment Apply topically 2 times daily. 50 g 0    Fluocinonide (VANOS) 0.1 % CREA Apply to affected area BID PRN flares 120 g 0    clobetasol (TEMOVATE) 0.05 % cream Apply to affected area BID PRN flares 45 g 1    IRON PO Take 1 tablet by mouth daily      Melatonin 3-10 MG TABS Take by mouth      Calcium Carb-Cholecalciferol (CALCIUM-VITAMIN D) 500-400 MG-UNIT TABS Take 1 tablet by mouth daily      therapeutic multivitamin-minerals (THERAGRAN-M) tablet Take 1 tablet by mouth daily.  ibuprofen (ADVIL;MOTRIN) 800 MG tablet Take 800 mg by mouth every 6 hours as needed.             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

## 2021-09-17 ENCOUNTER — OFFICE VISIT (OUTPATIENT)
Dept: FAMILY MEDICINE CLINIC | Age: 51
End: 2021-09-17
Payer: COMMERCIAL

## 2021-09-17 VITALS
BODY MASS INDEX: 36.12 KG/M2 | TEMPERATURE: 97.8 F | SYSTOLIC BLOOD PRESSURE: 120 MMHG | DIASTOLIC BLOOD PRESSURE: 84 MMHG | RESPIRATION RATE: 14 BRPM | OXYGEN SATURATION: 100 % | WEIGHT: 210.4 LBS | HEART RATE: 79 BPM

## 2021-09-17 DIAGNOSIS — K76.0 FATTY LIVER: ICD-10-CM

## 2021-09-17 DIAGNOSIS — G43.009 MIGRAINE WITHOUT AURA AND WITHOUT STATUS MIGRAINOSUS, NOT INTRACTABLE: ICD-10-CM

## 2021-09-17 DIAGNOSIS — F41.1 ANXIETY STATE: ICD-10-CM

## 2021-09-17 DIAGNOSIS — Z23 NEED FOR SHINGLES VACCINE: ICD-10-CM

## 2021-09-17 DIAGNOSIS — R73.03 PREDIABETES: Primary | ICD-10-CM

## 2021-09-17 DIAGNOSIS — E78.00 PURE HYPERCHOLESTEROLEMIA: ICD-10-CM

## 2021-09-17 DIAGNOSIS — K76.89 FOCAL NODULAR HYPERPLASIA OF LIVER: ICD-10-CM

## 2021-09-17 DIAGNOSIS — Z23 FLU VACCINE NEED: ICD-10-CM

## 2021-09-17 PROCEDURE — 3017F COLORECTAL CA SCREEN DOC REV: CPT | Performed by: FAMILY MEDICINE

## 2021-09-17 PROCEDURE — 99214 OFFICE O/P EST MOD 30 MIN: CPT | Performed by: FAMILY MEDICINE

## 2021-09-17 PROCEDURE — 90750 HZV VACC RECOMBINANT IM: CPT | Performed by: FAMILY MEDICINE

## 2021-09-17 PROCEDURE — 90471 IMMUNIZATION ADMIN: CPT | Performed by: FAMILY MEDICINE

## 2021-09-17 PROCEDURE — 90472 IMMUNIZATION ADMIN EACH ADD: CPT | Performed by: FAMILY MEDICINE

## 2021-09-17 PROCEDURE — 1036F TOBACCO NON-USER: CPT | Performed by: FAMILY MEDICINE

## 2021-09-17 PROCEDURE — G8427 DOCREV CUR MEDS BY ELIG CLIN: HCPCS | Performed by: FAMILY MEDICINE

## 2021-09-17 PROCEDURE — G8417 CALC BMI ABV UP PARAM F/U: HCPCS | Performed by: FAMILY MEDICINE

## 2021-09-17 PROCEDURE — 90756 CCIIV4 VACC ABX FREE IM: CPT | Performed by: FAMILY MEDICINE

## 2021-10-05 DIAGNOSIS — R73.03 PREDIABETES: ICD-10-CM

## 2021-10-06 RX ORDER — METFORMIN HYDROCHLORIDE 500 MG/1
TABLET, EXTENDED RELEASE ORAL
Qty: 180 TABLET | Refills: 1 | Status: SHIPPED | OUTPATIENT
Start: 2021-10-06 | End: 2022-03-18 | Stop reason: ALTCHOICE

## 2021-10-06 NOTE — TELEPHONE ENCOUNTER
Requested Prescriptions     Pending Prescriptions Disp Refills    metFORMIN (GLUCOPHAGE-XR) 500 MG extended release tablet [Pharmacy Med Name: METFORMIN ER TAB 500MG GP] 180 tablet 1     Sig: TAKE 2 TABLETS DAILY WITH  BREAKFAST       LOV 9/17/2021  NOV 3/18/22  Labs 9/18/21

## 2021-10-21 ENCOUNTER — TELEPHONE (OUTPATIENT)
Dept: FAMILY MEDICINE CLINIC | Age: 51
End: 2021-10-21

## 2021-10-21 NOTE — TELEPHONE ENCOUNTER
Pt. Informed. Pt states sxs started yesterday 10-20-21. Pt is going to try to get tested at her local pharm today.  She will call back tomorrow if decides wants to go to the [place] office and needs an order

## 2021-10-21 NOTE — TELEPHONE ENCOUNTER
She should delay vaccine until she has COVID test done. I will order test.  I need to know date sxs started for the order.   Thanks

## 2021-10-21 NOTE — TELEPHONE ENCOUNTER
----- Message from Nilson Martines sent at 10/21/2021  9:43 AM EDT -----  Subject: Message to Provider    QUESTIONS  Information for Provider? Patient is scheduled for a hep b shot on 10/22   and is starting to get cold symptoms. Patient would like to know if she   should still come in and if it is possible to have a COVID test ordered   for her.   ---------------------------------------------------------------------------  --------------  [address]  What is the best way for the office to contact you? OK to leave message on   voicemail  Preferred Call Back Phone Number? [phone]  ---------------------------------------------------------------------------  --------------  SCRIPT ANSWERS  Relationship to Patient?  Self

## 2021-10-21 NOTE — TELEPHONE ENCOUNTER
Patient is scheduled for a hep b shot on 10/22 and is starting to get cold symptoms. Patient would like to know if she should still come in and if it is possible to have a COVID test ordered for her.    Please advise  Thank you

## 2021-10-28 ENCOUNTER — E-VISIT (OUTPATIENT)
Dept: FAMILY MEDICINE CLINIC | Age: 51
End: 2021-10-28
Payer: COMMERCIAL

## 2021-10-28 DIAGNOSIS — J06.9 VIRAL URI: Primary | ICD-10-CM

## 2021-10-28 PROCEDURE — 99421 OL DIG E/M SVC 5-10 MIN: CPT | Performed by: FAMILY MEDICINE

## 2021-10-28 ASSESSMENT — LIFESTYLE VARIABLES: SMOKING_STATUS: NO, I HAVE NEVER SMOKED

## 2021-10-28 NOTE — PROGRESS NOTES
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 Social History     Tobacco Use    Smoking status: Never Smoker    Smokeless tobacco: Never Used   Substance Use Topics    Alcohol use: Yes     Alcohol/week: 0.0 standard drinks     Comment: 1 per month    Drug use: No      Allergies   Allergen Reactions    Demerol     Augmentin [Amoxicillin-Pot Clavulanate] Rash      Current Outpatient Medications on File Prior to Visit   Medication Sig Dispense Refill    metFORMIN (GLUCOPHAGE-XR) 500 MG extended release tablet TAKE 2 TABLETS DAILY WITH  BREAKFAST 180 tablet 1    atorvastatin (LIPITOR) 40 MG tablet TAKE 1 TABLET DAILY 90 tablet 1    escitalopram (LEXAPRO) 10 MG tablet TAKE 1 TABLET DAILY (Patient not taking: Reported on 9/17/2021) 90 tablet 1    UBRELVY 50 MG TABS TAKE ONE BY MOUTH DAILY AT ONSET OF MIGRAINE REPEAT IN 2 HOURS AS NEEDED. MAX 2 TABLETS EVERY 24 HOURS. 10 tablet 5    propranolol (INDERAL) 10 MG tablet Take 1 tablet by mouth  every 6 hours as needed 90 tablet 2    halobetasol (ULTRAVATE) 0.05 % ointment Apply topically 2 times daily. 50 g 0    Fluocinonide (VANOS) 0.1 % CREA Apply to affected area BID PRN flares 120 g 0    clobetasol (TEMOVATE) 0.05 % cream Apply to affected area BID PRN flares 45 g 1    IRON PO Take 1 tablet by mouth daily      Melatonin 3-10 MG TABS Take by mouth      Calcium Carb-Cholecalciferol (CALCIUM-VITAMIN D) 500-400 MG-UNIT TABS Take 1 tablet by mouth daily      therapeutic multivitamin-minerals (THERAGRAN-M) tablet Take 1 tablet by mouth daily.         ibuprofen (ADVIL;MOTRIN) 800 MG tablet Take 800 mg by mouth every 6 hours as needed. No current facility-administered medications on file prior to visit. Lab Results   Component Value Date     09/18/2021    K 4.8 09/18/2021    CL 97 09/18/2021    CO2 27 09/18/2021    BUN 9 09/18/2021    CREATININE 0.6 09/18/2021    GLUCOSE 102 09/18/2021    CALCIUM 9.9 09/18/2021            Diagnosis Orders   1. Viral URI         5-10 minutes were spent on the digital evaluation and management of this patient.

## 2021-10-30 DIAGNOSIS — E78.00 PURE HYPERCHOLESTEROLEMIA: ICD-10-CM

## 2021-10-30 DIAGNOSIS — K76.0 FATTY LIVER: ICD-10-CM

## 2021-11-01 RX ORDER — ATORVASTATIN CALCIUM 40 MG/1
TABLET, FILM COATED ORAL
Qty: 90 TABLET | Refills: 1 | Status: SHIPPED | OUTPATIENT
Start: 2021-11-01 | End: 2022-03-28

## 2021-11-01 NOTE — TELEPHONE ENCOUNTER
Requested Prescriptions     Pending Prescriptions Disp Refills    atorvastatin (LIPITOR) 40 MG tablet [Pharmacy Med Name: ATORVASTATIN TAB 40MG] 90 tablet 1     Sig: TAKE 1 TABLET DAILY       LOV 10/28/2021  NOV 3/8/22  Labs 9/18/21

## 2021-11-03 ENCOUNTER — PATIENT MESSAGE (OUTPATIENT)
Dept: FAMILY MEDICINE CLINIC | Age: 51
End: 2021-11-03

## 2021-11-03 DIAGNOSIS — J01.90 ACUTE BACTERIAL SINUSITIS: Primary | ICD-10-CM

## 2021-11-03 DIAGNOSIS — B96.89 ACUTE BACTERIAL SINUSITIS: Primary | ICD-10-CM

## 2021-11-03 RX ORDER — AZITHROMYCIN 250 MG/1
250 TABLET, FILM COATED ORAL SEE ADMIN INSTRUCTIONS
Qty: 6 TABLET | Refills: 0 | Status: SHIPPED | OUTPATIENT
Start: 2021-11-03 | End: 2021-11-08

## 2021-11-12 ENCOUNTER — NURSE ONLY (OUTPATIENT)
Dept: FAMILY MEDICINE CLINIC | Age: 51
End: 2021-11-12
Payer: COMMERCIAL

## 2021-11-12 DIAGNOSIS — Z23 NEED FOR VACCINATION WITH TWINRIX: Primary | ICD-10-CM

## 2021-11-12 PROCEDURE — 90471 IMMUNIZATION ADMIN: CPT | Performed by: FAMILY MEDICINE

## 2021-11-12 PROCEDURE — 90636 HEP A/HEP B VACC ADULT IM: CPT | Performed by: FAMILY MEDICINE

## 2022-03-07 ENCOUNTER — PATIENT MESSAGE (OUTPATIENT)
Dept: FAMILY MEDICINE CLINIC | Age: 52
End: 2022-03-07

## 2022-03-07 DIAGNOSIS — R73.03 PREDIABETES: ICD-10-CM

## 2022-03-07 DIAGNOSIS — D64.9 ANEMIA, UNSPECIFIED TYPE: ICD-10-CM

## 2022-03-07 DIAGNOSIS — E78.00 PURE HYPERCHOLESTEROLEMIA: Primary | ICD-10-CM

## 2022-03-08 NOTE — TELEPHONE ENCOUNTER
Pt requesting bloodwork prior to upcoming appointment and would like to add iron level to the bloodwork

## 2022-03-08 NOTE — TELEPHONE ENCOUNTER
From: Jayme Stain  To: Dr. Lujan Gin: 3/7/2022 7:09 PM EST  Subject: Chantelle Jansen,  I have an appointment next week and you had suggested I got bloodwork done prior to my appointment. If you could please put in an order with the lab I will take care of it a few days before the appointment. Also could you please add an iron level? I have been taking iron supplements based on test results from a few years ago and I would like to see if I need to continue those.   Thanks,  Brittney Turner

## 2022-03-14 NOTE — PROGRESS NOTES
Subjective:      Patient ID: Mor Simpson 46 y.o. female. The primary encounter diagnosis was Prediabetes. Diagnoses of Focal nodular hyperplasia of liver, Anxiety state, Fatty liver, Pure hypercholesterolemia, and Need for Tdap vaccination were also pertinent to this visit. HPI  DUE TDAP    Prediabetes: managed with diet, exercise and metformin. Last HGA1C was in the normal range. Diet:  Eating well. Exercise: 5 - 6 days a week. Weight: decreasing  She would like to get off meds. Hyperlipidemia/fatty liver, nodular liver:  Saw Dr. Jayson Harrison. Had liver MRI 4/2021 -  Mild fatty liver, FNH decreased in size compared to one year and 6 months prior. Follow up due now, she will call   No new myalgias or GI upset on atorvastatin (Lipitor). Medication compliance: compliant most of the time. Patient is  following a low fat, low cholesterol diet. She is  exercising regularly. Migraines: infrequent. Philbert Antes is very effective when needed. LMP 12/2021. She has not night sweats    Bump on the bottom of the left foot x few months. Can be uncomfortable if walking a lot.        The 10-year ASCVD risk score (Patsy Meek., et al., 2013) is: 1.5%    Values used to calculate the score:      Age: 46 years      Sex: Female      Is Non- : No      Diabetic: No      Tobacco smoker: No      Systolic Blood Pressure: 849 mmHg      Is BP treated: No      HDL Cholesterol: 39 mg/dL      Total Cholesterol: 154 mg/dL     Lab Results   Component Value Date    CHOL 154 03/15/2022    TRIG 193 (H) 03/15/2022    HDL 39 (L) 03/15/2022    LDLCALC 76 03/15/2022     Lab Results   Component Value Date    ALT 26 03/15/2022    AST 23 03/15/2022         Lab Results   Component Value Date    CHOL 154 03/15/2022    CHOL 129 09/18/2021    CHOL 138 03/23/2021     Lab Results   Component Value Date    TRIG 193 (H) 03/15/2022    TRIG 144 09/18/2021    TRIG 215 (H) 03/23/2021     Lab Results   Component Value Date    HDL 39 (L) 03/15/2022    HDL 40 09/18/2021    HDL 32 (L) 03/23/2021     Lab Results   Component Value Date    LDLCALC 76 03/15/2022    LDLCALC 60 09/18/2021    LDLCALC 63 03/23/2021     Lab Results   Component Value Date    LABVLDL 39 03/15/2022    LABVLDL 29 09/18/2021    LABVLDL 43 03/23/2021     No results found for: [facility]   Labs Renal Latest Ref Rng & Units 3/15/2022 9/18/2021 3/23/2021 8/5/2020 2/4/2020   BUN 7 - 20 mg/dL 9 9 9 10 8   Cr 0.6 - 1.1 mg/dL 0.6 0.6 0.7 0.7 0.6   K 3.5 - 5.1 mmol/L 4.6 4.8 4.8 5.2(H) 4.6   Na 136 - 145 mmol/L 139 137 139 140 138     Lab Results   Component Value Date    LABA1C 5.2 03/15/2022     Lab Results   Component Value Date    .5 03/15/2022      TSH   Date Value Ref Range Status   03/15/2022 4.20 0.27 - 4.20 uIU/mL Final   03/23/2021 3.97 0.27 - 4.20 uIU/mL Final   04/10/2019 3.03 0.27 - 4.20 uIU/mL Final   11/02/2012 3.05 0.35 - 5.5 uIU/ml Final   09/23/2011 3.42 0.35 - 5.5 uIU/ml Final   09/09/2011 2.63 0.35 - 5.5 uIU/ml Final      Lab Results   Component Value Date    WBC 9.8 03/15/2022    HGB 13.5 03/15/2022    HCT 40.9 03/15/2022    MCV 83.5 03/15/2022     03/15/2022     Lab Results   Component Value Date    IRON 49 03/15/2022    TIBC 259 (L) 03/15/2022    FERRITIN 173.9 (H) 03/15/2022        Outpatient Medications Marked as Taking for the 3/18/22 encounter (Office Visit) with Lucita Montanez MD   Medication Sig Dispense Refill    atorvastatin (LIPITOR) 40 MG tablet TAKE 1 TABLET DAILY 90 tablet 1    metFORMIN (GLUCOPHAGE-XR) 500 MG extended release tablet TAKE 2 TABLETS DAILY WITH  BREAKFAST 180 tablet 1    UBRELVY 50 MG TABS TAKE ONE BY MOUTH DAILY AT ONSET OF MIGRAINE REPEAT IN 2 HOURS AS NEEDED. MAX 2 TABLETS EVERY 24 HOURS. 10 tablet 5    propranolol (INDERAL) 10 MG tablet Take 1 tablet by mouth  every 6 hours as needed 90 tablet 2    halobetasol (ULTRAVATE) 0.05 % ointment Apply topically 2 times daily.  50 g 0    Fluocinonide (VANOS) 0.1 % CREA Apply to affected area BID PRN flares 120 g 0    clobetasol (TEMOVATE) 0.05 % cream Apply to affected area BID PRN flares 45 g 1    Melatonin 3-10 MG TABS Take by mouth      Calcium Carb-Cholecalciferol (CALCIUM-VITAMIN D) 500-400 MG-UNIT TABS Take 1 tablet by mouth daily      therapeutic multivitamin-minerals (THERAGRAN-M) tablet Take 1 tablet by mouth daily.  ibuprofen (ADVIL;MOTRIN) 800 MG tablet Take 800 mg by mouth every 6 hours as needed. 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 No past surgical history on file. Family History   Problem Relation Age of Onset    Stroke Father     High Cholesterol Father     Diabetes Mother     Hypertension Mother     Coronary Art Dis Mother    Genevia Nares Migraines Mother         complex    High Cholesterol Mother        Social History     Tobacco Use    Smoking status: Never Smoker    Smokeless tobacco: Never Used   Substance Use Topics    Alcohol use: Yes     Alcohol/week: 0.0 standard drinks     Comment: 1 per month    Drug use: No            Review of Systems  Review of Systems    Objective:   Physical Exam  Vitals:    03/18/22 1437   BP: 120/86   Pulse: 65   Resp: 12   Temp: 97.3 °F (36.3 °C)   TempSrc: Temporal   SpO2: 100%   Weight: 207 lb 12.8 oz (94.3 kg)   Height: 5' 4\" (1.626 m)     Wt Readings from Last 3 Encounters:   03/18/22 207 lb 12.8 oz (94.3 kg)   09/17/21 210 lb 6.4 oz (95.4 kg)   03/19/21 220 lb (99.8 kg)        Physical Exam    NAD    Skin is warm and dry. No rash.  Well hydrated  Alert and oriented x 3. Mood and affect are normal.  The neck is supple and free of adenopathy or masses, the thyroid is normal without enlargement or nodules. Chest: clear with no wheezes or rales. No retractions, or use of accessory muscles noted. Cardiovascular: PMI is not displaced, and no thrill noted. Regular rate and rhythm with no rub, murmur or gallop. No peripheral edema. No carotid bruits. The abdomen is soft without tenderness, guarding, mass, rebound or organomegaly. Aorta, femoral, DP and PT pulses intact. lateral ball left foot 3 1 to 3 mm verrucous growths  Treatment options discussed and addressed risk of scar an infection. Treated with liquid nitrogen 45 second thaw x 2.  . Tolerated well. Assessment:      .   Diagnosis Orders   1. Prediabetes  Resolved  Stop Metformin   2. Focal nodular hyperplasia of liver  Follow up with GI as planned   3. Anxiety state  Well controlled   4. Fatty liver  LFTs normal.  Continue low fat diet, wt loss, statin   5. Pure hypercholesterolemia  LDL at goal < 130   Continue statin   6. Need for Tdap vaccination  Tdap (age 6y and older) IM ([address])   7. Plantar wart of left foot  FL DESTRUCTION BENIGN LESIONS UP TO 14  Wound care addressed. Plan:      Side effects of current medications reviewed and questions answered. Follow up in 6 months or prn.

## 2022-03-15 DIAGNOSIS — E78.00 PURE HYPERCHOLESTEROLEMIA: ICD-10-CM

## 2022-03-15 DIAGNOSIS — D64.9 ANEMIA, UNSPECIFIED TYPE: ICD-10-CM

## 2022-03-15 DIAGNOSIS — R73.03 PREDIABETES: ICD-10-CM

## 2022-03-15 LAB
A/G RATIO: 1.5 (ref 1.1–2.2)
ALBUMIN SERPL-MCNC: 4.5 G/DL (ref 3.4–5)
ALP BLD-CCNC: 89 U/L (ref 40–129)
ALT SERPL-CCNC: 26 U/L (ref 10–40)
ANION GAP SERPL CALCULATED.3IONS-SCNC: 15 MMOL/L (ref 3–16)
AST SERPL-CCNC: 23 U/L (ref 15–37)
BILIRUB SERPL-MCNC: 1.7 MG/DL (ref 0–1)
BUN BLDV-MCNC: 9 MG/DL (ref 7–20)
CALCIUM SERPL-MCNC: 10.1 MG/DL (ref 8.3–10.6)
CHLORIDE BLD-SCNC: 97 MMOL/L (ref 99–110)
CHOLESTEROL, TOTAL: 154 MG/DL (ref 0–199)
CO2: 27 MMOL/L (ref 21–32)
CREAT SERPL-MCNC: 0.6 MG/DL (ref 0.6–1.1)
FERRITIN: 173.9 NG/ML (ref 15–150)
GFR AFRICAN AMERICAN: >60
GFR NON-AFRICAN AMERICAN: >60
GLUCOSE BLD-MCNC: 93 MG/DL (ref 70–99)
HCT VFR BLD CALC: 40.9 % (ref 36–48)
HDLC SERPL-MCNC: 39 MG/DL (ref 40–60)
HEMOGLOBIN: 13.5 G/DL (ref 12–16)
IRON SATURATION: 19 % (ref 15–50)
IRON: 49 UG/DL (ref 37–145)
LDL CHOLESTEROL CALCULATED: 76 MG/DL
MCH RBC QN AUTO: 27.5 PG (ref 26–34)
MCHC RBC AUTO-ENTMCNC: 33 G/DL (ref 31–36)
MCV RBC AUTO: 83.5 FL (ref 80–100)
PDW BLD-RTO: 15.1 % (ref 12.4–15.4)
PLATELET # BLD: 364 K/UL (ref 135–450)
PMV BLD AUTO: 8.6 FL (ref 5–10.5)
POTASSIUM SERPL-SCNC: 4.6 MMOL/L (ref 3.5–5.1)
RBC # BLD: 4.9 M/UL (ref 4–5.2)
SODIUM BLD-SCNC: 139 MMOL/L (ref 136–145)
TOTAL IRON BINDING CAPACITY: 259 UG/DL (ref 260–445)
TOTAL PROTEIN: 7.6 G/DL (ref 6.4–8.2)
TRIGL SERPL-MCNC: 193 MG/DL (ref 0–150)
TSH REFLEX: 4.2 UIU/ML (ref 0.27–4.2)
VLDLC SERPL CALC-MCNC: 39 MG/DL
WBC # BLD: 9.8 K/UL (ref 4–11)

## 2022-03-16 LAB
ESTIMATED AVERAGE GLUCOSE: 102.5 MG/DL
HBA1C MFR BLD: 5.2 %

## 2022-03-18 ENCOUNTER — TELEPHONE (OUTPATIENT)
Dept: FAMILY MEDICINE CLINIC | Age: 52
End: 2022-03-18

## 2022-03-18 ENCOUNTER — OFFICE VISIT (OUTPATIENT)
Dept: FAMILY MEDICINE CLINIC | Age: 52
End: 2022-03-18
Payer: COMMERCIAL

## 2022-03-18 VITALS
HEIGHT: 64 IN | BODY MASS INDEX: 35.48 KG/M2 | RESPIRATION RATE: 12 BRPM | WEIGHT: 207.8 LBS | TEMPERATURE: 97.3 F | OXYGEN SATURATION: 100 % | HEART RATE: 65 BPM | SYSTOLIC BLOOD PRESSURE: 120 MMHG | DIASTOLIC BLOOD PRESSURE: 86 MMHG

## 2022-03-18 DIAGNOSIS — K76.0 FATTY LIVER: ICD-10-CM

## 2022-03-18 DIAGNOSIS — K76.89 FOCAL NODULAR HYPERPLASIA OF LIVER: ICD-10-CM

## 2022-03-18 DIAGNOSIS — F41.1 ANXIETY STATE: ICD-10-CM

## 2022-03-18 DIAGNOSIS — E78.00 PURE HYPERCHOLESTEROLEMIA: ICD-10-CM

## 2022-03-18 DIAGNOSIS — Z23 NEED FOR TDAP VACCINATION: ICD-10-CM

## 2022-03-18 DIAGNOSIS — R73.03 PREDIABETES: Primary | ICD-10-CM

## 2022-03-18 DIAGNOSIS — B07.0 PLANTAR WART OF LEFT FOOT: ICD-10-CM

## 2022-03-18 PROCEDURE — 1036F TOBACCO NON-USER: CPT | Performed by: FAMILY MEDICINE

## 2022-03-18 PROCEDURE — G8482 FLU IMMUNIZE ORDER/ADMIN: HCPCS | Performed by: FAMILY MEDICINE

## 2022-03-18 PROCEDURE — G8417 CALC BMI ABV UP PARAM F/U: HCPCS | Performed by: FAMILY MEDICINE

## 2022-03-18 PROCEDURE — 17110 DESTRUCTION B9 LES UP TO 14: CPT | Performed by: FAMILY MEDICINE

## 2022-03-18 PROCEDURE — G8427 DOCREV CUR MEDS BY ELIG CLIN: HCPCS | Performed by: FAMILY MEDICINE

## 2022-03-18 PROCEDURE — 90715 TDAP VACCINE 7 YRS/> IM: CPT | Performed by: FAMILY MEDICINE

## 2022-03-18 PROCEDURE — 3017F COLORECTAL CA SCREEN DOC REV: CPT | Performed by: FAMILY MEDICINE

## 2022-03-18 PROCEDURE — 90471 IMMUNIZATION ADMIN: CPT | Performed by: FAMILY MEDICINE

## 2022-03-18 PROCEDURE — 99214 OFFICE O/P EST MOD 30 MIN: CPT | Performed by: FAMILY MEDICINE

## 2022-03-18 SDOH — ECONOMIC STABILITY: FOOD INSECURITY: WITHIN THE PAST 12 MONTHS, YOU WORRIED THAT YOUR FOOD WOULD RUN OUT BEFORE YOU GOT MONEY TO BUY MORE.: NEVER TRUE

## 2022-03-18 SDOH — ECONOMIC STABILITY: FOOD INSECURITY: WITHIN THE PAST 12 MONTHS, THE FOOD YOU BOUGHT JUST DIDN'T LAST AND YOU DIDN'T HAVE MONEY TO GET MORE.: NEVER TRUE

## 2022-03-18 ASSESSMENT — SOCIAL DETERMINANTS OF HEALTH (SDOH): HOW HARD IS IT FOR YOU TO PAY FOR THE VERY BASICS LIKE FOOD, HOUSING, MEDICAL CARE, AND HEATING?: NOT HARD AT ALL

## 2022-03-21 ENCOUNTER — TELEPHONE (OUTPATIENT)
Dept: ADMINISTRATIVE | Age: 52
End: 2022-03-21

## 2022-03-21 NOTE — TELEPHONE ENCOUNTER
Submitted PA for Ubrelvy 50MG tablets    Via CMM Key: [id number] STATUS: APPROVED effective 03/21/2022 - 03/21/2023    Please notify patient.  Thank you

## 2022-03-25 DIAGNOSIS — E78.00 PURE HYPERCHOLESTEROLEMIA: ICD-10-CM

## 2022-03-25 DIAGNOSIS — K76.0 FATTY LIVER: ICD-10-CM

## 2022-03-28 RX ORDER — ATORVASTATIN CALCIUM 40 MG/1
TABLET, FILM COATED ORAL
Qty: 90 TABLET | Refills: 3 | Status: SHIPPED | OUTPATIENT
Start: 2022-03-28

## 2022-03-28 NOTE — TELEPHONE ENCOUNTER
Requested Prescriptions     Pending Prescriptions Disp Refills    atorvastatin (LIPITOR) 40 MG tablet [Pharmacy Med Name: ATORVASTATIN TAB 40MG] 90 tablet 1     Sig: TAKE 1 TABLET DAILY     Last ov 3/18/22  Last lab 3/15/22  Next ov 9/16/22

## 2022-04-26 ENCOUNTER — OFFICE VISIT (OUTPATIENT)
Dept: DERMATOLOGY | Age: 52
End: 2022-04-26
Payer: COMMERCIAL

## 2022-04-26 ENCOUNTER — TELEPHONE (OUTPATIENT)
Dept: DERMATOLOGY | Age: 52
End: 2022-04-26

## 2022-04-26 VITALS — TEMPERATURE: 97.8 F

## 2022-04-26 DIAGNOSIS — L72.0 EPIDERMOID CYST: ICD-10-CM

## 2022-04-26 DIAGNOSIS — L82.1 SEBORRHEIC KERATOSIS: ICD-10-CM

## 2022-04-26 DIAGNOSIS — B07.0 PLANTAR WART: ICD-10-CM

## 2022-04-26 DIAGNOSIS — Z80.8 FAMILY HISTORY OF MALIGNANT MELANOMA: ICD-10-CM

## 2022-04-26 DIAGNOSIS — Z87.2 HISTORY OF DERMATITIS: ICD-10-CM

## 2022-04-26 DIAGNOSIS — D22.9 MULTIPLE NEVI: Primary | ICD-10-CM

## 2022-04-26 PROCEDURE — 99214 OFFICE O/P EST MOD 30 MIN: CPT | Performed by: DERMATOLOGY

## 2022-04-26 PROCEDURE — G8417 CALC BMI ABV UP PARAM F/U: HCPCS | Performed by: DERMATOLOGY

## 2022-04-26 PROCEDURE — G8427 DOCREV CUR MEDS BY ELIG CLIN: HCPCS | Performed by: DERMATOLOGY

## 2022-04-26 PROCEDURE — 17110 DESTRUCTION B9 LES UP TO 14: CPT | Performed by: DERMATOLOGY

## 2022-04-26 PROCEDURE — 3017F COLORECTAL CA SCREEN DOC REV: CPT | Performed by: DERMATOLOGY

## 2022-04-26 PROCEDURE — 1036F TOBACCO NON-USER: CPT | Performed by: DERMATOLOGY

## 2022-04-26 NOTE — TELEPHONE ENCOUNTER
Patient is returning call. Patient was last seen 11/2020. Patient is requesting a return call to [facility] appt. [phone]. Please advise. Thank you!

## 2022-04-26 NOTE — PROGRESS NOTES
[facility] Dermat[facility]  Yogesh Hawthorne MD  [phone]      Francesco Robledo  1970    46 y.o. female     Date of Visit: 4/26/2022    Chief Complaint: moles, rash  Chief Complaint   Patient presents with    Verruca Vulgaris     bottom of both feet - pcp treated with LN2 x 1 month ago no improvement     Last seen: ~     History of Present Illness:    *Her mom passed away in fall of 2016. Dad passed away in May 2020.    1, 2. Here for evaluation of multiple asx pigmented lesions on the trunk and extremities, present for many years; no change in size/shape/color of any lesions; no bleeding lesions. She has a papule on the back x many years - asx currently but bothers her more intermittently. Family hx of melanoma. 3. F/u dermatitis - previously on the shin/legs, hands and scalp. rx'd clobetasol in the past - this helps. No flares recently. 4. C/o \"warts\" on the plantar surfaces that appeared 2-3 mos ago. Treated once by PCP w/o resolution. No personal hx of skin cancer. Father with hx of melanoma. Wears sunscreen regularly. No tanning bed use. Review of Systems:  Gen: Feels well, good sense of health. Skin: No changing moles or lesions. Past Medical History, Family History, Surgical History, Medications and Allergies reviewed. History reviewed. No pertinent past medical history. History reviewed. No pertinent surgical history. Outpatient Medications Marked as Taking for the 4/26/22 encounter (Office Visit) with Cristian Rizo MD   Medication Sig Dispense Refill    atorvastatin (LIPITOR) 40 MG tablet TAKE 1 TABLET DAILY 90 tablet 3    UBRELVY 50 MG TABS TAKE ONE BY MOUTH DAILY AT ONSET OF MIGRAINE REPEAT IN 2 HOURS AS NEEDED. MAX 2 TABLETS EVERY 24 HOURS. 10 tablet 5    propranolol (INDERAL) 10 MG tablet Take 1 tablet by mouth  every 6 hours as needed 90 tablet 2    halobetasol (ULTRAVATE) 0.05 % ointment Apply topically 2 times daily.  50 g 0  Fluocinonide (VANOS) 0.1 % CREA Apply to affected area BID PRN flares 120 g 0    clobetasol (TEMOVATE) 0.05 % cream Apply to affected area BID PRN flares 45 g 1    IRON PO Take 1 tablet by mouth daily       Melatonin 3-10 MG TABS Take by mouth      Calcium Carb-Cholecalciferol (CALCIUM-VITAMIN D) 500-400 MG-UNIT TABS Take 1 tablet by mouth daily      therapeutic multivitamin-minerals (THERAGRAN-M) tablet Take 1 tablet by mouth daily.  ibuprofen (ADVIL;MOTRIN) 800 MG tablet Take 800 mg by mouth every 6 hours as needed. Allergies   Allergen Reactions    Demerol     Augmentin [Amoxicillin-Pot Clavulanate] Rash         Physical Examination     Gen, well-appearing  FSE today    trunk and extremities with scattered brown macules and papules   Lower back with ~ 1 cm cystic nodule with punctum  Legs clear    Both feet - Lateral plantar FF with calloused papules with central keratotic core - 3 total                Previous flare:          Assessment and Plan     1. Benign-appearing nevi and SK's   1b. Family hx of melanoma  - educ re ABCD's of MM   educ sun protection   encouraged skin check yearly (sooner if indicated), self checks    2. epidermoid cyst - lower back  - reassured re benign fx  - discussed rtn for excision since it is more bothersome - discussed procedure, sutures, wound    3. dermatitis - stable/clear today  - clobetasol cream bid until clear and prn flares; ed se/misuse    4. Plantar warts on the plantar FF (3)  - ddx corns - may actually favor this  - pared, LN2 to 3 lesions and ed donut pad  - f/u 6 weeks  - discussed that these may be corns and may recur with pressure/friction      F/u 1-2 years for FSE.

## 2022-06-07 ENCOUNTER — OFFICE VISIT (OUTPATIENT)
Dept: DERMATOLOGY | Age: 52
End: 2022-06-07
Payer: COMMERCIAL

## 2022-06-07 DIAGNOSIS — L84 CORNS: Primary | ICD-10-CM

## 2022-06-07 PROCEDURE — G8427 DOCREV CUR MEDS BY ELIG CLIN: HCPCS | Performed by: DERMATOLOGY

## 2022-06-07 PROCEDURE — G8417 CALC BMI ABV UP PARAM F/U: HCPCS | Performed by: DERMATOLOGY

## 2022-06-07 PROCEDURE — 99212 OFFICE O/P EST SF 10 MIN: CPT | Performed by: DERMATOLOGY

## 2022-06-07 PROCEDURE — 3017F COLORECTAL CA SCREEN DOC REV: CPT | Performed by: DERMATOLOGY

## 2022-06-07 PROCEDURE — 1036F TOBACCO NON-USER: CPT | Performed by: DERMATOLOGY

## 2022-06-07 NOTE — PROGRESS NOTES
[facility] Dermatology  Rodriguez Hidalgo MD  [phone]      Zula No  1970    46 y.o. female     Date of Visit: 6/7/2022    Chief Complaint: f/u warts vs corns  Chief Complaint   Patient presents with    Verruca Vulgaris     6 wk f/u bilateral feet     Last seen: ~ 4-2022 for FSE    History of Present Illness:    *Her mom passed away in fall of 2016. Dad passed away in May 2020. F/u for \"warts\" on the plantar surfaces that appeared 2-3 mos ago. Actually favored corn > wart at last visit but pared and treated with LN2 to see response. Improved but keratotic papules still present. Treated once previously by PCP w/o resolution. No personal hx of skin cancer. Father with hx of melanoma. Wears sunscreen regularly. No tanning bed use. Review of Systems:  Gen: Feels well, good sense of health. Past Medical History, Family History, Surgical History, Medications and Allergies reviewed. No past medical history on file. No past surgical history on file. Outpatient Medications Marked as Taking for the 6/7/22 encounter (Office Visit) with Diane Capone MD   Medication Sig Dispense Refill    atorvastatin (LIPITOR) 40 MG tablet TAKE 1 TABLET DAILY 90 tablet 3    UBRELVY 50 MG TABS TAKE ONE BY MOUTH DAILY AT ONSET OF MIGRAINE REPEAT IN 2 HOURS AS NEEDED. MAX 2 TABLETS EVERY 24 HOURS. 10 tablet 5    propranolol (INDERAL) 10 MG tablet Take 1 tablet by mouth  every 6 hours as needed 90 tablet 2    halobetasol (ULTRAVATE) 0.05 % ointment Apply topically 2 times daily.  50 g 0    Fluocinonide (VANOS) 0.1 % CREA Apply to affected area BID PRN flares 120 g 0    clobetasol (TEMOVATE) 0.05 % cream Apply to affected area BID PRN flares 45 g 1    IRON PO Take 1 tablet by mouth daily       Melatonin 3-10 MG TABS Take by mouth      Calcium Carb-Cholecalciferol (CALCIUM-VITAMIN D) 500-400 MG-UNIT TABS Take 1 tablet by mouth daily      therapeutic multivitamin-minerals [facility]) tablet Take 1 tablet by mouth daily.  ibuprofen (ADVIL;MOTRIN) 800 MG tablet Take 800 mg by mouth every 6 hours as needed. Allergies   Allergen Reactions    Demerol     Augmentin [Amoxicillin-Pot Clavulanate] Rash         Physical Examination     Gen, well-appearing    Both feet (L - 1 and R - 2) - Lateral plantar FF with calloused papules with central keratotic core - 3 total  These are smaller/thinner than they were                Previous flare:          Assessment and Plan     Favor corns on the plantar FF (3) - better but not clear  - ddx warts  - pared and ed donut pads to avoid pressure  - f/u if recurrent  - discussed that these may recur with pressure/friction  - consider sal acid +/- 5-FU if recurrent    F/u 1 year for FSE, sooner prn for above.

## 2022-07-28 LAB — DIABETIC RETINOPATHY: NEGATIVE

## 2022-09-28 ENCOUNTER — PATIENT MESSAGE (OUTPATIENT)
Dept: FAMILY MEDICINE CLINIC | Age: 52
End: 2022-09-28

## 2022-09-28 DIAGNOSIS — R79.89 ELEVATED FERRITIN: ICD-10-CM

## 2022-09-28 DIAGNOSIS — E78.00 PURE HYPERCHOLESTEROLEMIA: ICD-10-CM

## 2022-09-28 DIAGNOSIS — R73.03 PREDIABETES: Primary | ICD-10-CM

## 2022-09-28 NOTE — TELEPHONE ENCOUNTER
From: Tiffanie Cadena  To: Dr. Alicia Mandujano: 9/28/2022 11:20 AM EDT  Subject: Siomara Nielsen,  Could I please get an order for bloodwork in preparation for my appointment next week? See you then.   Thanks,  Carolyn Boast

## 2022-09-30 DIAGNOSIS — R73.03 PREDIABETES: ICD-10-CM

## 2022-09-30 DIAGNOSIS — E78.00 PURE HYPERCHOLESTEROLEMIA: ICD-10-CM

## 2022-09-30 DIAGNOSIS — R79.89 ELEVATED FERRITIN: ICD-10-CM

## 2022-09-30 LAB
A/G RATIO: 1.2 (ref 1.1–2.2)
ALBUMIN SERPL-MCNC: 4.3 G/DL (ref 3.4–5)
ALP BLD-CCNC: 102 U/L (ref 40–129)
ALT SERPL-CCNC: 24 U/L (ref 10–40)
ANION GAP SERPL CALCULATED.3IONS-SCNC: 12 MMOL/L (ref 3–16)
AST SERPL-CCNC: 23 U/L (ref 15–37)
BILIRUB SERPL-MCNC: 1.5 MG/DL (ref 0–1)
BUN BLDV-MCNC: 9 MG/DL (ref 7–20)
CALCIUM SERPL-MCNC: 10 MG/DL (ref 8.3–10.6)
CHLORIDE BLD-SCNC: 97 MMOL/L (ref 99–110)
CHOLESTEROL, TOTAL: 171 MG/DL (ref 0–199)
CO2: 29 MMOL/L (ref 21–32)
CREAT SERPL-MCNC: 0.6 MG/DL (ref 0.6–1.1)
FERRITIN: 179.2 NG/ML (ref 15–150)
GFR AFRICAN AMERICAN: >60
GFR NON-AFRICAN AMERICAN: >60
GLUCOSE BLD-MCNC: 94 MG/DL (ref 70–99)
HDLC SERPL-MCNC: 42 MG/DL (ref 40–60)
LDL CHOLESTEROL CALCULATED: 92 MG/DL
POTASSIUM SERPL-SCNC: 4.8 MMOL/L (ref 3.5–5.1)
SODIUM BLD-SCNC: 138 MMOL/L (ref 136–145)
TOTAL PROTEIN: 7.8 G/DL (ref 6.4–8.2)
TRIGL SERPL-MCNC: 183 MG/DL (ref 0–150)
VLDLC SERPL CALC-MCNC: 37 MG/DL

## 2022-10-01 LAB
ESTIMATED AVERAGE GLUCOSE: 99.7 MG/DL
HBA1C MFR BLD: 5.1 %

## 2022-10-05 ENCOUNTER — OFFICE VISIT (OUTPATIENT)
Dept: FAMILY MEDICINE CLINIC | Age: 52
End: 2022-10-05
Payer: COMMERCIAL

## 2022-10-05 VITALS
TEMPERATURE: 97.1 F | SYSTOLIC BLOOD PRESSURE: 120 MMHG | DIASTOLIC BLOOD PRESSURE: 74 MMHG | RESPIRATION RATE: 14 BRPM | WEIGHT: 200 LBS | HEART RATE: 85 BPM | BODY MASS INDEX: 34.33 KG/M2 | OXYGEN SATURATION: 98 %

## 2022-10-05 DIAGNOSIS — R79.89 ELEVATED FERRITIN: ICD-10-CM

## 2022-10-05 DIAGNOSIS — E78.00 PURE HYPERCHOLESTEROLEMIA: Primary | ICD-10-CM

## 2022-10-05 DIAGNOSIS — K76.0 FATTY LIVER: ICD-10-CM

## 2022-10-05 DIAGNOSIS — K76.89 FOCAL NODULAR HYPERPLASIA OF LIVER: ICD-10-CM

## 2022-10-05 DIAGNOSIS — R73.03 PREDIABETES: ICD-10-CM

## 2022-10-05 DIAGNOSIS — Z23 NEED FOR INFLUENZA VACCINATION: ICD-10-CM

## 2022-10-05 DIAGNOSIS — G43.009 MIGRAINE WITHOUT AURA AND WITHOUT STATUS MIGRAINOSUS, NOT INTRACTABLE: ICD-10-CM

## 2022-10-05 PROCEDURE — G8484 FLU IMMUNIZE NO ADMIN: HCPCS | Performed by: FAMILY MEDICINE

## 2022-10-05 PROCEDURE — 90674 CCIIV4 VAC NO PRSV 0.5 ML IM: CPT | Performed by: FAMILY MEDICINE

## 2022-10-05 PROCEDURE — G8427 DOCREV CUR MEDS BY ELIG CLIN: HCPCS | Performed by: FAMILY MEDICINE

## 2022-10-05 PROCEDURE — 1036F TOBACCO NON-USER: CPT | Performed by: FAMILY MEDICINE

## 2022-10-05 PROCEDURE — G8417 CALC BMI ABV UP PARAM F/U: HCPCS | Performed by: FAMILY MEDICINE

## 2022-10-05 PROCEDURE — 90471 IMMUNIZATION ADMIN: CPT | Performed by: FAMILY MEDICINE

## 2022-10-05 PROCEDURE — 3017F COLORECTAL CA SCREEN DOC REV: CPT | Performed by: FAMILY MEDICINE

## 2022-10-05 PROCEDURE — 99214 OFFICE O/P EST MOD 30 MIN: CPT | Performed by: FAMILY MEDICINE

## 2022-10-05 ASSESSMENT — PATIENT HEALTH QUESTIONNAIRE - PHQ9
SUM OF ALL RESPONSES TO PHQ QUESTIONS 1-9: 0
1. LITTLE INTEREST OR PLEASURE IN DOING THINGS: 0
SUM OF ALL RESPONSES TO PHQ QUESTIONS 1-9: 0
SUM OF ALL RESPONSES TO PHQ9 QUESTIONS 1 & 2: 0
SUM OF ALL RESPONSES TO PHQ QUESTIONS 1-9: 0
2. FEELING DOWN, DEPRESSED OR HOPELESS: 0
SUM OF ALL RESPONSES TO PHQ QUESTIONS 1-9: 0

## 2022-10-05 NOTE — PROGRESS NOTES
Subjective:      Patient ID: Everett Garcia 46 y.o. female. The primary encounter diagnosis was Prediabetes. Diagnoses of Fatty liver, Migraine without aura and without status migrainosus, not intractable, and Focal nodular hyperplasia of liver were also pertinent to this visit. HPI    Fatty liver and FNH:  is on a low fat diet. Dr. Amelie Ruiz recommended MRI annually. She wonders if she needs it. Last MRI showed improvement. Weight is down 10 lbs. She is exercising most days; walking or treadmill, elliptical and weights. She retired recently and has more time to take care of herself. Wart on her foot is back. Hurts to walk. Would like it shaved but not treated with liquid nitrogen  Dr Tati Snow did this and it was effective for some time. Migraines infrequent. Ibuprofen or Ubrevely helps    LMP 4 months ago. No hot flashes or night sweats. Hyperlipidemia:  No new myalgias or GI upset on atorvastatin (Lipitor). Medication compliance: compliant most of the time. Patient is  following a low fat, low cholesterol diet. She is  exercising regularly.      Lab Results   Component Value Date    CHOL 171 09/30/2022    TRIG 183 (H) 09/30/2022    HDL 42 09/30/2022    LDLCALC 92 09/30/2022     Lab Results   Component Value Date    ALT 24 09/30/2022    AST 23 09/30/2022           Lab Results   Component Value Date     09/30/2022    K 4.8 09/30/2022    CL 97 (L) 09/30/2022    CO2 29 09/30/2022    BUN 9 09/30/2022    CREATININE 0.6 09/30/2022    GLUCOSE 94 09/30/2022    CALCIUM 10.0 09/30/2022    PROT 7.8 09/30/2022    LABALBU 4.3 09/30/2022    BILITOT 1.5 (H) 09/30/2022    ALKPHOS 102 09/30/2022    AST 23 09/30/2022    ALT 24 09/30/2022    LABGLOM >60 09/30/2022    GFRAA >60 09/30/2022    AGRATIO 1.2 09/30/2022    GLOB 3.1 09/18/2021        Lab Results   Component Value Date    WBC 9.8 03/15/2022    HGB 13.5 03/15/2022    HCT 40.9 03/15/2022    MCV 83.5 03/15/2022     03/15/2022 Lab Results   Component Value Date    CHOL 171 09/30/2022    CHOL 154 03/15/2022    CHOL 129 09/18/2021     Lab Results   Component Value Date    TRIG 183 (H) 09/30/2022    TRIG 193 (H) 03/15/2022    TRIG 144 09/18/2021     Lab Results   Component Value Date    HDL 42 09/30/2022    HDL 39 (L) 03/15/2022    HDL 40 09/18/2021     Lab Results   Component Value Date    LDLCALC 92 09/30/2022    LDLCALC 76 03/15/2022    LDLCALC 60 09/18/2021     Lab Results   Component Value Date    LABVLDL 37 09/30/2022    LABVLDL 39 03/15/2022    LABVLDL 29 09/18/2021     No results found for: [place]   Lab Results   Component Value Date    LABA1C 5.1 09/30/2022     Lab Results   Component Value Date    EAG 99.7 09/30/2022      Outpatient Medications Marked as Taking for the 10/5/22 encounter (Office Visit) with Joi Wade MD   Medication Sig Dispense Refill    atorvastatin (LIPITOR) 40 MG tablet TAKE 1 TABLET DAILY 90 tablet 3    UBRELVY 50 MG TABS TAKE ONE BY MOUTH DAILY AT ONSET OF MIGRAINE REPEAT IN 2 HOURS AS NEEDED. MAX 2 TABLETS EVERY 24 HOURS. 10 tablet 5    propranolol (INDERAL) 10 MG tablet Take 1 tablet by mouth  every 6 hours as needed 90 tablet 2    halobetasol (ULTRAVATE) 0.05 % ointment Apply topically 2 times daily. 50 g 0    Fluocinonide (VANOS) 0.1 % CREA Apply to affected area BID PRN flares 120 g 0    clobetasol (TEMOVATE) 0.05 % cream Apply to affected area BID PRN flares 45 g 1    Melatonin 3-10 MG TABS Take by mouth      Calcium Carb-Cholecalciferol (CALCIUM-VITAMIN D) 500-400 MG-UNIT TABS Take 1 tablet by mouth daily      therapeutic multivitamin-minerals (THERAGRAN-M) tablet Take 1 tablet by mouth daily. ibuprofen (ADVIL;MOTRIN) 800 MG tablet Take 800 mg by mouth every 6 hours as needed.             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 No pertinent past medical history. History reviewed. No pertinent surgical history. Family History   Problem Relation Age of Onset    Stroke Father     High Cholesterol Father     Diabetes Mother     Hypertension Mother     Coronary Art Dis Mother     Migraines Mother         complex    High Cholesterol Mother        Social History     Tobacco Use    Smoking status: Never    Smokeless tobacco: Never   Substance Use Topics    Alcohol use: Yes     Alcohol/week: 0.0 standard drinks     Comment: 1 per month    Drug use: No            Review of Systems  Review of Systems    Objective:   Physical Exam  Vitals:    10/05/22 1418   BP: 120/74   Pulse: 85   Resp: 14   Temp: 97.1 °F (36.2 °C)   TempSrc: Temporal   SpO2: 98%   Weight: 200 lb (90.7 kg)     Wt Readings from Last 3 Encounters:   10/05/22 200 lb (90.7 kg)   03/18/22 207 lb 12.8 oz (94.3 kg)   09/17/21 210 lb 6.4 oz (95.4 kg)      Physical Exam  NAD    Skin is warm and dry. No rash. Well hydrated  Alert and oriented x 3. Mood and affect are normal.  The neck is supple and free of adenopathy or masses, the thyroid is normal without enlargement or nodules. Chest: clear with no wheezes or rales. No retractions, or use of accessory muscles noted. Cardiovascular: PMI is not displaced, and no thrill noted. Regular rate and rhythm with no rub, murmur or gallop. No peripheral edema. The abdomen is soft without tenderness, guarding, mass, rebound or organomegaly. Aorta, femoral, DP and PT pulses intact. Callous left lateral ball foot laterally. Assessment:       Diagnosis Orders   1. Pure hypercholesterolemia  LDL at goal < 100  Continue statin      2.  Fatty liver  LFTs normal.  Continue statin, diet and exercise      3. Prediabetes  Resolved   Continue diet and exercise       4. Migraine without aura and without status migrainosus, not intractable        5. Focal nodular hyperplasia of liver  Reviewed recommendations on Care Everywhere. No follow up recommended. Discussed        6. COVID booster next month. Plan:      Side effects of current medications reviewed and questions answered. Follow up in 1 year or prn.

## 2023-03-23 ENCOUNTER — TELEPHONE (OUTPATIENT)
Dept: FAMILY MEDICINE CLINIC | Age: 53
End: 2023-03-23

## 2023-05-21 DIAGNOSIS — K76.0 FATTY LIVER: ICD-10-CM

## 2023-05-21 DIAGNOSIS — E78.00 PURE HYPERCHOLESTEROLEMIA: ICD-10-CM

## 2023-05-22 RX ORDER — ATORVASTATIN CALCIUM 40 MG/1
TABLET, FILM COATED ORAL
Qty: 90 TABLET | Refills: 3 | OUTPATIENT
Start: 2023-05-22

## 2023-05-24 ENCOUNTER — HOSPITAL ENCOUNTER (OUTPATIENT)
Dept: GENERAL RADIOLOGY | Age: 53
Discharge: HOME | End: 2023-05-24
Payer: COMMERCIAL

## 2023-05-24 DIAGNOSIS — S20.212A CONTUSION OF RIB ON LEFT SIDE, INITIAL ENCOUNTER: ICD-10-CM

## 2023-05-24 PROBLEM — S22.32XD CLOSED FRACTURE OF ONE RIB OF LEFT SIDE WITH ROUTINE HEALING: Status: ACTIVE | Noted: 2023-05-24

## 2023-05-24 PROCEDURE — 71100 X-RAY EXAM RIBS UNI 2 VIEWS: CPT

## 2023-07-07 DIAGNOSIS — K76.0 FATTY LIVER: ICD-10-CM

## 2023-07-07 DIAGNOSIS — E78.00 PURE HYPERCHOLESTEROLEMIA: ICD-10-CM

## 2023-07-10 RX ORDER — ATORVASTATIN CALCIUM 40 MG/1
40 TABLET, FILM COATED ORAL DAILY
Qty: 90 TABLET | Refills: 0 | Status: SHIPPED | OUTPATIENT
Start: 2023-07-10

## 2023-07-10 NOTE — TELEPHONE ENCOUNTER
Requested Prescriptions     Pending Prescriptions Disp Refills    atorvastatin (LIPITOR) 40 MG tablet 90 tablet 3     Sig: Take 1 tablet by mouth daily        Last OV: 5/25/23    Last labs: 10/5/22     F/u: 8/30/23

## 2023-07-30 ENCOUNTER — PATIENT MESSAGE (OUTPATIENT)
Age: 53
End: 2023-07-30

## 2023-07-30 DIAGNOSIS — S20.212S CONTUSION OF RIB ON LEFT SIDE, SEQUELA: Primary | ICD-10-CM

## 2023-07-31 ENCOUNTER — HOSPITAL ENCOUNTER (OUTPATIENT)
Age: 53
Discharge: HOME | End: 2023-07-31
Payer: COMMERCIAL

## 2023-07-31 ENCOUNTER — HOSPITAL ENCOUNTER (OUTPATIENT)
Dept: GENERAL RADIOLOGY | Age: 53
Discharge: HOME | End: 2023-07-31
Payer: COMMERCIAL

## 2023-07-31 DIAGNOSIS — S20.212S CONTUSION OF RIB ON LEFT SIDE, SEQUELA: ICD-10-CM

## 2023-07-31 PROCEDURE — 71100 X-RAY EXAM RIBS UNI 2 VIEWS: CPT

## 2023-07-31 NOTE — TELEPHONE ENCOUNTER
From: Tai Push  To: Dr. Carrillo  5:35 PM EDT  Subject: Rib    Hi Dr. Raynard Buerger you are having a good summer. At week 6 (end of ), my rib seemed fine, and I thought I was fully healed. However, I am now at week 10, and I have noticed over the past week or two that the rib is bothering me again (mild intermittent discomfort). I don't believe there has been any specific incident that would have re-injured it. Not sure if it didn't heal properly or if I started sleeping on my left side too soon. I just wanted to check in and get your thoughts.   Thanks,  Carlos Bauman

## 2023-08-25 DIAGNOSIS — R73.03 PREDIABETES: ICD-10-CM

## 2023-08-25 DIAGNOSIS — E78.00 PURE HYPERCHOLESTEROLEMIA: ICD-10-CM

## 2023-08-25 DIAGNOSIS — R79.89 ELEVATED FERRITIN: ICD-10-CM

## 2023-08-26 LAB
ALBUMIN SERPL-MCNC: 4.7 G/DL (ref 3.4–5)
ALBUMIN/GLOB SERPL: 1.5 {RATIO} (ref 1.1–2.2)
ALP SERPL-CCNC: 99 U/L (ref 40–129)
ALT SERPL-CCNC: 21 U/L (ref 10–40)
ANION GAP SERPL CALCULATED.3IONS-SCNC: 13 MMOL/L (ref 3–16)
AST SERPL-CCNC: 21 U/L (ref 15–37)
BILIRUB SERPL-MCNC: 1.4 MG/DL (ref 0–1)
BUN SERPL-MCNC: 12 MG/DL (ref 7–20)
CALCIUM SERPL-MCNC: 10 MG/DL (ref 8.3–10.6)
CHLORIDE SERPL-SCNC: 96 MMOL/L (ref 99–110)
CHOLEST SERPL-MCNC: 185 MG/DL (ref 0–199)
CO2 SERPL-SCNC: 27 MMOL/L (ref 21–32)
CREAT SERPL-MCNC: 0.7 MG/DL (ref 0.6–1.1)
EST. AVERAGE GLUCOSE BLD GHB EST-MCNC: 96.8 MG/DL
FERRITIN SERPL IA-MCNC: 135 NG/ML (ref 15–150)
GFR SERPLBLD CREATININE-BSD FMLA CKD-EPI: >60 ML/MIN/{1.73_M2}
GLUCOSE SERPL-MCNC: 97 MG/DL (ref 70–99)
HBA1C MFR BLD: 5 %
HDLC SERPL-MCNC: 44 MG/DL (ref 40–60)
LDLC SERPL CALC-MCNC: 102 MG/DL
POTASSIUM SERPL-SCNC: 4.8 MMOL/L (ref 3.5–5.1)
PROT SERPL-MCNC: 7.8 G/DL (ref 6.4–8.2)
SODIUM SERPL-SCNC: 136 MMOL/L (ref 136–145)
TRIGL SERPL-MCNC: 197 MG/DL (ref 0–150)
TSH SERPL DL<=0.005 MIU/L-ACNC: 4 UIU/ML (ref 0.27–4.2)
VLDLC SERPL CALC-MCNC: 39 MG/DL

## 2023-08-30 ENCOUNTER — OFFICE VISIT (OUTPATIENT)
Age: 53
End: 2023-08-30
Payer: COMMERCIAL

## 2023-08-30 VITALS
WEIGHT: 196.56 LBS | DIASTOLIC BLOOD PRESSURE: 72 MMHG | TEMPERATURE: 97.3 F | HEIGHT: 64 IN | RESPIRATION RATE: 16 BRPM | OXYGEN SATURATION: 99 % | BODY MASS INDEX: 33.56 KG/M2 | SYSTOLIC BLOOD PRESSURE: 116 MMHG | HEART RATE: 63 BPM

## 2023-08-30 DIAGNOSIS — Z00.00 WELL ADULT EXAM: Primary | ICD-10-CM

## 2023-08-30 DIAGNOSIS — E78.00 PURE HYPERCHOLESTEROLEMIA: ICD-10-CM

## 2023-08-30 DIAGNOSIS — M67.449 DIGITAL MUCINOUS CYST: ICD-10-CM

## 2023-08-30 DIAGNOSIS — K76.0 FATTY LIVER: ICD-10-CM

## 2023-08-30 DIAGNOSIS — R73.03 PREDIABETES: ICD-10-CM

## 2023-08-30 DIAGNOSIS — M75.80 ROTATOR CUFF TENDINITIS, UNSPECIFIED LATERALITY: ICD-10-CM

## 2023-08-30 PROCEDURE — 99396 PREV VISIT EST AGE 40-64: CPT | Performed by: FAMILY MEDICINE

## 2023-08-30 ASSESSMENT — ENCOUNTER SYMPTOMS
NAUSEA: 0
ANAL BLEEDING: 0
WHEEZING: 0
VOICE CHANGE: 0
ABDOMINAL PAIN: 0
CHOKING: 0
CHEST TIGHTNESS: 0
COLOR CHANGE: 0
SHORTNESS OF BREATH: 0
DIARRHEA: 0
BLOOD IN STOOL: 0
SORE THROAT: 0
TROUBLE SWALLOWING: 0
CONSTIPATION: 0

## 2023-09-11 ENCOUNTER — OFFICE VISIT (OUTPATIENT)
Dept: DERMATOLOGY | Age: 53
End: 2023-09-11
Payer: COMMERCIAL

## 2023-09-11 DIAGNOSIS — L57.8 DIFFUSE PHOTODAMAGE OF SKIN: ICD-10-CM

## 2023-09-11 DIAGNOSIS — Z80.8 FAMILY HISTORY OF MALIGNANT MELANOMA: ICD-10-CM

## 2023-09-11 DIAGNOSIS — L82.1 SEBORRHEIC KERATOSIS: ICD-10-CM

## 2023-09-11 DIAGNOSIS — L72.0 EPIDERMOID CYST: ICD-10-CM

## 2023-09-11 DIAGNOSIS — D17.0 LIPOMA OF FOREHEAD: ICD-10-CM

## 2023-09-11 DIAGNOSIS — Z87.2 HISTORY OF DERMATITIS: ICD-10-CM

## 2023-09-11 DIAGNOSIS — D22.9 MULTIPLE NEVI: Primary | ICD-10-CM

## 2023-09-11 PROCEDURE — G8427 DOCREV CUR MEDS BY ELIG CLIN: HCPCS | Performed by: DERMATOLOGY

## 2023-09-11 PROCEDURE — 3017F COLORECTAL CA SCREEN DOC REV: CPT | Performed by: DERMATOLOGY

## 2023-09-11 PROCEDURE — 99213 OFFICE O/P EST LOW 20 MIN: CPT | Performed by: DERMATOLOGY

## 2023-09-11 PROCEDURE — 1036F TOBACCO NON-USER: CPT | Performed by: DERMATOLOGY

## 2023-09-11 PROCEDURE — G8417 CALC BMI ABV UP PARAM F/U: HCPCS | Performed by: DERMATOLOGY

## 2023-09-11 NOTE — PROGRESS NOTES
[facility] Dermatology  Elton Sanchez MD  [phone]      Patrick Lang  1970    45 y.o. female     Date of Visit: 9/11/2023    Chief Complaint: moles, rash  Chief Complaint   Patient presents with    Skin Exam     FSE     HX: multi nevi     Last seen: ~ 4-2022 and 6-2022    History of Present Illness:    *Her mom passed away in fall of 2016. Dad passed away in May 2020.    1. Here for evaluation of multiple asx pigmented lesions on the trunk and extremities, present for many years; no change in size/shape/color of any lesions; no bleeding lesions. Family hx of melanoma. 2. She has a papule on the back x many years - asx currently. 3. F/u dermatitis - previously on the shin/legs, hands and scalp. rx'd clobetasol in the past - this helps. No flares recently. 4. C/o discoloration/erythema of the upper chest - asx. No trx tried. 5. Also with subtle papule/nodule on the R upper FH x unknown duration - noted on exam and is asx. Few corns on the plantar surfaces in the past - better in recent years. No personal hx of skin cancer. Father with hx of melanoma. Wears sunscreen regularly. No tanning bed use. Review of Systems:  Gen: Feels well, good sense of health. Skin: No changing moles or lesions. Past Medical History, Family History, Surgical History, Medications and Allergies reviewed. History reviewed. No pertinent past medical history. History reviewed. No pertinent surgical history. Outpatient Medications Marked as Taking for the 9/11/23 encounter (Office Visit) with Gómez Hernandez MD   Medication Sig Dispense Refill    atorvastatin (LIPITOR) 40 MG tablet Take 1 tablet by mouth daily 90 tablet 0    propranolol (INDERAL) 10 MG tablet Take 1 tablet by mouth  every 6 hours as needed 90 tablet 2    UBRELVY 50 MG TABS TAKE ONE BY MOUTH DAILY AT ONSET OF MIGRAINE REPEAT IN 2 HOURS AS NEEDED. MAX 2 TABLETS EVERY 24 HOURS.  10 tablet 5    halobetasol

## 2023-09-12 ENCOUNTER — APPOINTMENT (OUTPATIENT)
Dept: PHYSICAL THERAPY | Age: 53
End: 2023-09-12
Payer: COMMERCIAL

## 2023-09-13 ENCOUNTER — HOSPITAL ENCOUNTER (OUTPATIENT)
Dept: PHYSICAL THERAPY | Age: 53
Setting detail: THERAPIES SERIES
Discharge: HOME | End: 2023-09-13
Payer: COMMERCIAL

## 2023-09-13 PROCEDURE — 97110 THERAPEUTIC EXERCISES: CPT | Performed by: PHYSICAL THERAPIST

## 2023-09-13 PROCEDURE — 97161 PT EVAL LOW COMPLEX 20 MIN: CPT | Performed by: PHYSICAL THERAPIST

## 2023-09-13 NOTE — FLOWSHEET NOTE
patient tolerance, in order to prevent re-injury. [] Progressing: [] Met: [] Not Met: [] Adjusted   2. Patient will have a decrease in pain to facilitate improvement in movement, function, and ADLs as indicated by Functional Deficits. [] Progressing: [] Met: [] Not Met: [] Adjusted    Long Term Goals: To be achieved in: 8 weeks  1. Disability index score of <3% deficit on UEFI to assist with reaching prior level of function. [] Progressing: [] Met: [] Not Met: [] Adjusted  2. Patient will demonstrate an increase in strength to 5/5 L shoulder flex/scap/abd/ER to allow for proper functional mobility as indicated by patients Functional Deficits. [] Progressing: [] Met: [] Not Met: [] Adjusted  3. Patient will return to sleeping through the night functional activities without increased symptoms or restriction. [] Progressing: [] Met: [] Not Met: [] Adjusted    Overall Progression Towards Functional goals/ Treatment Progress Update:  [] Patient is progressing as expected towards functional goals listed. [] Progression is slowed due to complexities/Impairments listed. [] Progression has been slowed due to co-morbidities.   [x] Plan just implemented, too soon to assess goals progression <30days   [] Goals require adjustment due to lack of progress  [] Patient is not progressing as expected and requires additional follow up with physician  [] Other    ASSESSMENT:  See eval    Treatment/Activity Tolerance:  [x] Patient tolerated treatment well [] Patient limited by fatique  [] Patient limited by pain  [] Patient limited by other medical complications  [] Other:     Prognosis: [x] Good [] Fair  [] Poor    Patient Requires Follow-up: [x] Yes  [] No    PLAN: See eval  [] Continue per plan of care [] Alter current plan (see comments)  [x] Plan of care initiated [] Hold pending MD visit [] Discharge        Electronically signed by: Cheryl Chaudhari PT, OMT-C        Note: If patient does not return for scheduled/

## 2023-09-14 NOTE — TELEPHONE ENCOUNTER
Requested Prescriptions     Pending Prescriptions Disp Refills    UBRELVY 50 MG TABS [Pharmacy Med Name: UBRELVY 50 MG TABLET] 10 tablet 5     Sig: TAKE ONE TABLET BY MOUTH AT ONSET OF MIGRAINE; MAY REPEAT ONE TABLET IN 2 HOURS IF NEEDED.  MAX OF 2 TABLETS EVERY 24 HOURS        Last OV: 5/25/23    Last labs: 8/25/23     F/u: 12/27/23

## 2023-09-15 RX ORDER — UBROGEPANT 50 MG/1
TABLET ORAL
Qty: 10 TABLET | Refills: 12 | Status: SHIPPED | OUTPATIENT
Start: 2023-09-15

## 2023-09-20 RX ORDER — TRAMADOL HYDROCHLORIDE 50 MG/1
TABLET ORAL
Status: ON HOLD | COMMUNITY
Start: 2023-05-25 | End: 2023-09-22 | Stop reason: ALTCHOICE

## 2023-09-20 RX ORDER — METFORMIN HYDROCHLORIDE 500 MG/1
TABLET, EXTENDED RELEASE ORAL
Status: ON HOLD | COMMUNITY
Start: 2020-12-02 | End: 2023-09-22 | Stop reason: ALTCHOICE

## 2023-09-20 RX ORDER — ESCITALOPRAM OXALATE 10 MG/1
TABLET ORAL
Status: ON HOLD | COMMUNITY
Start: 2020-12-31 | End: 2023-09-22 | Stop reason: ALTCHOICE

## 2023-09-20 RX ORDER — DOXYCYCLINE HYCLATE 100 MG/1
CAPSULE ORAL
Status: ON HOLD | COMMUNITY
End: 2023-09-22 | Stop reason: ALTCHOICE

## 2023-09-20 RX ORDER — ALPRAZOLAM 0.5 MG/1
TABLET ORAL
Status: ON HOLD | COMMUNITY
End: 2023-09-22 | Stop reason: ALTCHOICE

## 2023-09-20 RX ORDER — TIZANIDINE 4 MG/1
TABLET ORAL
Status: ON HOLD | COMMUNITY
Start: 2023-05-22 | End: 2023-09-22 | Stop reason: ALTCHOICE

## 2023-09-20 NOTE — PROGRESS NOTES
PRE-OP INSTRUCTIONS FOR SURGICAL PATIENTS          Our Pre-admission Testing Nurses tried and were unable to reach you today. Please read the attached instructions if you did not listen to your voicemail. Follow all instructions provided to you from your surgeon's office, including your ARRIVAL TIME. Arrange for someone to drive you home and be with you for the first 24 hours after discharge. NOTE: at this time ONLY 2 ADULTS may accompany you   One person encouraged to stay at hospital entire time if outpatient surgery    Enter the MAIN entrance located on [address] and report to the surgical desk on the LEFT side of the lobby. Please park in the parking garage or there is free Rezzie available after 7am for your use. Bring your insurance card & photo ID with you to register. Bring your medication list with you with dose and frequency listed (including over the counter medications)  Contact your ordering physician/surgeon for medication instructions as soon as possible, especially if taking blood thinners, aspirin, heart, or diabetic medication. Bariatric surgical patients need to call your surgeon if on diabetic medications (as some may need to be stopped 1-week preop)  A Pre-Surgical History and Physical MUST be completed WITHIN 30 DAYS OR LESS prior to your procedure by your Physician or an Urgent Care. DO NOT EAT ANYTHING 8 hours prior to arrival for surgery. You may have sips of WATER ONLY (up to 8 ounces) 4 hours prior to your arrival for surgery. Then nothing further 4 hours prior to arriving at hospital.   NOTE: ALL Gastric, Bariatric & Bowel surgery patients - you MUST follow your surgeon's instructions regarding eating/drinking as you will have very specific instructions to follow. If you did not receive these, call your surgeon's office immediately. No gum, candy, mints, or ice chips day of procedure.    Please refrain from drinking alcohol the day before or day of your

## 2023-09-21 ENCOUNTER — ANESTHESIA EVENT (OUTPATIENT)
Dept: OPERATING ROOM | Age: 53
End: 2023-09-21
Payer: COMMERCIAL

## 2023-09-22 ENCOUNTER — ANESTHESIA (OUTPATIENT)
Dept: OPERATING ROOM | Age: 53
End: 2023-09-22
Payer: COMMERCIAL

## 2023-09-22 ENCOUNTER — APPOINTMENT (OUTPATIENT)
Dept: GENERAL RADIOLOGY | Age: 53
End: 2023-09-22
Attending: ORTHOPAEDIC SURGERY
Payer: COMMERCIAL

## 2023-09-22 ENCOUNTER — HOSPITAL ENCOUNTER (OUTPATIENT)
Age: 53
Setting detail: OUTPATIENT SURGERY
Discharge: HOME | End: 2023-09-22
Attending: ORTHOPAEDIC SURGERY | Admitting: ORTHOPAEDIC SURGERY
Payer: COMMERCIAL

## 2023-09-22 VITALS
SYSTOLIC BLOOD PRESSURE: 158 MMHG | OXYGEN SATURATION: 94 % | DIASTOLIC BLOOD PRESSURE: 85 MMHG | TEMPERATURE: 97.3 F | WEIGHT: 199.4 LBS | RESPIRATION RATE: 16 BRPM | BODY MASS INDEX: 34.04 KG/M2 | HEART RATE: 70 BPM | HEIGHT: 64 IN

## 2023-09-22 DIAGNOSIS — M67.441 GANGLION CYST OF FINGER OF RIGHT HAND: ICD-10-CM

## 2023-09-22 PROCEDURE — 2580000003 HC RX 258: Performed by: ORTHOPAEDIC SURGERY

## 2023-09-22 PROCEDURE — 3600000004 HC SURGERY LEVEL 4 BASE: Performed by: ORTHOPAEDIC SURGERY

## 2023-09-22 PROCEDURE — 73140 X-RAY EXAM OF FINGER(S): CPT

## 2023-09-22 PROCEDURE — 3700000001 HC ADD 15 MINUTES (ANESTHESIA): Performed by: ORTHOPAEDIC SURGERY

## 2023-09-22 PROCEDURE — 6360000002 HC RX W HCPCS: Performed by: NURSE ANESTHETIST, CERTIFIED REGISTERED

## 2023-09-22 PROCEDURE — 88304 TISSUE EXAM BY PATHOLOGIST: CPT

## 2023-09-22 PROCEDURE — 6360000002 HC RX W HCPCS: Performed by: ORTHOPAEDIC SURGERY

## 2023-09-22 PROCEDURE — A4217 STERILE WATER/SALINE, 500 ML: HCPCS | Performed by: ORTHOPAEDIC SURGERY

## 2023-09-22 PROCEDURE — 6370000000 HC RX 637 (ALT 250 FOR IP): Performed by: ORTHOPAEDIC SURGERY

## 2023-09-22 PROCEDURE — 7100000001 HC PACU RECOVERY - ADDTL 15 MIN: Performed by: ORTHOPAEDIC SURGERY

## 2023-09-22 PROCEDURE — 7100000000 HC PACU RECOVERY - FIRST 15 MIN: Performed by: ORTHOPAEDIC SURGERY

## 2023-09-22 PROCEDURE — 7100000011 HC PHASE II RECOVERY - ADDTL 15 MIN: Performed by: ORTHOPAEDIC SURGERY

## 2023-09-22 PROCEDURE — 2709999900 HC NON-CHARGEABLE SUPPLY: Performed by: ORTHOPAEDIC SURGERY

## 2023-09-22 PROCEDURE — 3600000014 HC SURGERY LEVEL 4 ADDTL 15MIN: Performed by: ORTHOPAEDIC SURGERY

## 2023-09-22 PROCEDURE — 7100000010 HC PHASE II RECOVERY - FIRST 15 MIN: Performed by: ORTHOPAEDIC SURGERY

## 2023-09-22 PROCEDURE — 3700000000 HC ANESTHESIA ATTENDED CARE: Performed by: ORTHOPAEDIC SURGERY

## 2023-09-22 PROCEDURE — 2580000003 HC RX 258: Performed by: ANESTHESIOLOGY

## 2023-09-22 PROCEDURE — 2500000003 HC RX 250 WO HCPCS: Performed by: ORTHOPAEDIC SURGERY

## 2023-09-22 RX ORDER — HYDROMORPHONE HYDROCHLORIDE 1 MG/ML
0.5 INJECTION, SOLUTION INTRAMUSCULAR; INTRAVENOUS; SUBCUTANEOUS EVERY 10 MIN PRN
Status: DISCONTINUED | OUTPATIENT
Start: 2023-09-22 | End: 2023-09-22 | Stop reason: HOSPADM

## 2023-09-22 RX ORDER — HYDROMORPHONE HYDROCHLORIDE 1 MG/ML
0.5 INJECTION, SOLUTION INTRAMUSCULAR; INTRAVENOUS; SUBCUTANEOUS EVERY 5 MIN PRN
Status: DISCONTINUED | OUTPATIENT
Start: 2023-09-22 | End: 2023-09-22 | Stop reason: HOSPADM

## 2023-09-22 RX ORDER — HYDROCODONE BITARTRATE AND ACETAMINOPHEN 5; 325 MG/1; MG/1
1 TABLET ORAL EVERY 6 HOURS PRN
Qty: 14 TABLET | Refills: 0 | Status: SHIPPED | OUTPATIENT
Start: 2023-09-22 | End: 2023-09-27

## 2023-09-22 RX ORDER — METOCLOPRAMIDE HYDROCHLORIDE 5 MG/ML
10 INJECTION INTRAMUSCULAR; INTRAVENOUS
Status: DISCONTINUED | OUTPATIENT
Start: 2023-09-22 | End: 2023-09-22 | Stop reason: HOSPADM

## 2023-09-22 RX ORDER — SODIUM CHLORIDE 9 MG/ML
INJECTION, SOLUTION INTRAVENOUS PRN
Status: DISCONTINUED | OUTPATIENT
Start: 2023-09-22 | End: 2023-09-22 | Stop reason: HOSPADM

## 2023-09-22 RX ORDER — MIDAZOLAM HYDROCHLORIDE 1 MG/ML
INJECTION INTRAMUSCULAR; INTRAVENOUS PRN
Status: DISCONTINUED | OUTPATIENT
Start: 2023-09-22 | End: 2023-09-22 | Stop reason: SDUPTHER

## 2023-09-22 RX ORDER — CLINDAMYCIN PHOSPHATE 900 MG/50ML
900 INJECTION INTRAVENOUS ONCE
Status: COMPLETED | OUTPATIENT
Start: 2023-09-22 | End: 2023-09-22

## 2023-09-22 RX ORDER — SODIUM CHLORIDE, SODIUM LACTATE, POTASSIUM CHLORIDE, CALCIUM CHLORIDE 600; 310; 30; 20 MG/100ML; MG/100ML; MG/100ML; MG/100ML
INJECTION, SOLUTION INTRAVENOUS CONTINUOUS
Status: DISCONTINUED | OUTPATIENT
Start: 2023-09-22 | End: 2023-09-22 | Stop reason: HOSPADM

## 2023-09-22 RX ORDER — DIPHENHYDRAMINE HYDROCHLORIDE 50 MG/ML
12.5 INJECTION INTRAMUSCULAR; INTRAVENOUS
Status: DISCONTINUED | OUTPATIENT
Start: 2023-09-22 | End: 2023-09-22 | Stop reason: HOSPADM

## 2023-09-22 RX ORDER — MAGNESIUM HYDROXIDE 1200 MG/15ML
LIQUID ORAL CONTINUOUS PRN
Status: COMPLETED | OUTPATIENT
Start: 2023-09-22 | End: 2023-09-22

## 2023-09-22 RX ORDER — KETOROLAC TROMETHAMINE 30 MG/ML
INJECTION, SOLUTION INTRAMUSCULAR; INTRAVENOUS PRN
Status: DISCONTINUED | OUTPATIENT
Start: 2023-09-22 | End: 2023-09-22 | Stop reason: SDUPTHER

## 2023-09-22 RX ORDER — LABETALOL HYDROCHLORIDE 5 MG/ML
10 INJECTION, SOLUTION INTRAVENOUS
Status: DISCONTINUED | OUTPATIENT
Start: 2023-09-22 | End: 2023-09-22 | Stop reason: HOSPADM

## 2023-09-22 RX ORDER — GINSENG 100 MG
CAPSULE ORAL PRN
Status: DISCONTINUED | OUTPATIENT
Start: 2023-09-22 | End: 2023-09-22 | Stop reason: HOSPADM

## 2023-09-22 RX ORDER — FENTANYL CITRATE 50 UG/ML
INJECTION, SOLUTION INTRAMUSCULAR; INTRAVENOUS PRN
Status: DISCONTINUED | OUTPATIENT
Start: 2023-09-22 | End: 2023-09-22 | Stop reason: SDUPTHER

## 2023-09-22 RX ORDER — PROPOFOL 10 MG/ML
INJECTION, EMULSION INTRAVENOUS PRN
Status: DISCONTINUED | OUTPATIENT
Start: 2023-09-22 | End: 2023-09-22 | Stop reason: SDUPTHER

## 2023-09-22 RX ORDER — SODIUM CHLORIDE 0.9 % (FLUSH) 0.9 %
5-40 SYRINGE (ML) INJECTION PRN
Status: DISCONTINUED | OUTPATIENT
Start: 2023-09-22 | End: 2023-09-22 | Stop reason: HOSPADM

## 2023-09-22 RX ORDER — SODIUM CHLORIDE 0.9 % (FLUSH) 0.9 %
5-40 SYRINGE (ML) INJECTION EVERY 12 HOURS SCHEDULED
Status: DISCONTINUED | OUTPATIENT
Start: 2023-09-22 | End: 2023-09-22 | Stop reason: HOSPADM

## 2023-09-22 RX ORDER — HYDRALAZINE HYDROCHLORIDE 20 MG/ML
10 INJECTION INTRAMUSCULAR; INTRAVENOUS
Status: DISCONTINUED | OUTPATIENT
Start: 2023-09-22 | End: 2023-09-22 | Stop reason: HOSPADM

## 2023-09-22 RX ORDER — ONDANSETRON 2 MG/ML
INJECTION INTRAMUSCULAR; INTRAVENOUS PRN
Status: DISCONTINUED | OUTPATIENT
Start: 2023-09-22 | End: 2023-09-22 | Stop reason: SDUPTHER

## 2023-09-22 RX ORDER — MEPERIDINE HYDROCHLORIDE 25 MG/ML
12.5 INJECTION INTRAMUSCULAR; INTRAVENOUS; SUBCUTANEOUS EVERY 5 MIN PRN
Status: DISCONTINUED | OUTPATIENT
Start: 2023-09-22 | End: 2023-09-22 | Stop reason: HOSPADM

## 2023-09-22 RX ADMIN — MIDAZOLAM HYDROCHLORIDE 2 MG: 2 INJECTION, SOLUTION INTRAMUSCULAR; INTRAVENOUS at 07:29

## 2023-09-22 RX ADMIN — CLINDAMYCIN PHOSPHATE 900 MG: 900 INJECTION, SOLUTION INTRAVENOUS at 07:34

## 2023-09-22 RX ADMIN — ONDANSETRON 4 MG: 2 INJECTION INTRAMUSCULAR; INTRAVENOUS at 08:12

## 2023-09-22 RX ADMIN — SODIUM CHLORIDE, POTASSIUM CHLORIDE, SODIUM LACTATE AND CALCIUM CHLORIDE: 600; 310; 30; 20 INJECTION, SOLUTION INTRAVENOUS at 06:44

## 2023-09-22 RX ADMIN — KETOROLAC TROMETHAMINE 30 MG: 30 INJECTION, SOLUTION INTRAMUSCULAR; INTRAVENOUS at 08:10

## 2023-09-22 RX ADMIN — PROPOFOL 50 MG: 10 INJECTION, EMULSION INTRAVENOUS at 07:50

## 2023-09-22 RX ADMIN — PROPOFOL 50 MG: 10 INJECTION, EMULSION INTRAVENOUS at 07:36

## 2023-09-22 RX ADMIN — FENTANYL CITRATE 100 MCG: 50 INJECTION, SOLUTION INTRAMUSCULAR; INTRAVENOUS at 07:35

## 2023-09-22 ASSESSMENT — PAIN SCALES - GENERAL
PAINLEVEL_OUTOF10: 0

## 2023-09-22 NOTE — ANESTHESIA POSTPROCEDURE EVALUATION
Department of Anesthesiology  Postprocedure Note    Patient: Damaso Sarabia  MRN: [id number]  YOB: 1970  Date of evaluation: 9/22/2023      Procedure Summary     Date: 09/22/23 Room / Location: [address] [address] Replaced by [facility]    Anesthesia Start: 0732 Anesthesia Stop: 0674    Procedure: EXCISIONAL BIOPSY OF RIGHT SMALL FINGER DORSAL SOFT TISSUE MASS WITH EXCISION OF OSTEOPHYTE (Right: Hand) Diagnosis:       Ganglion cyst of finger of right hand      (Ganglion cyst of finger of right hand [M67.441])    Surgeons: Hugh Salas MD Responsible Provider: Gokul Glasgow MD    Anesthesia Type: general ASA Status: 2          Anesthesia Type: No value filed.     Patricio Phase I: Patricio Score: 10    Patricio Phase II: Patricio Score: 10      Anesthesia Post Evaluation    Patient location during evaluation: PACU  Patient participation: complete - patient participated  Level of consciousness: awake and alert  Pain score: 0  Airway patency: patent  Nausea & Vomiting: no nausea and no vomiting  Complications: no  Cardiovascular status: hemodynamically stable  Respiratory status: acceptable  Hydration status: euvolemic  Pain management: adequate

## 2023-09-22 NOTE — OP NOTE
[facility]  Operative Report  [facility]     Patient:  Juilssa Rivera  Date of Surgery:  9/22/2023    Pre-Op Diagnoses:  1. Mucous cyst of right small finger with osteophyte DIP j oint   2. Post-op Diagnoses:   1. Same  2. Procedure(s) Performed:  1. Mucous cyst excision of right small finger with osteophyte excision   2. Interpretation of fluoroscopy 3 views right small finger          Surgeon:  Dilma Chester MD  Assist:  [id number] [facility]     Anesthesia Type:   Local/MAC    Blood Loss:   less than 5ml     Pathology:   Cyst right small finger     Complications:   None immediate apparent     Antibiotics:  See Anesthesia Note    The surgical site was marked in preop holding by Dr Michele Mckeon. Cyst was visible on the operative digit. Informed and signed consent was on the chart. Risks and benefits were discussed again, including the possibility of recurrence. The patient was brought to the operating and room and placed in the supine position. After the induction of anesthesia a standard time-out was performed. Antibiotics prophylaxis was in place. Description of the Procedure: Following the injection, the right upper extremity was prepped and draped in the normal sterile fashion. After final timeout, the right small finger was exsanguinated and a tournicot applied to the base of the finger. A T-type incision was created over the DIP joint crease on the side of the cyst.  Sharp dissection was taken through skin only. Full-thickness skin flaps were elevated protecting the extensor tendon. The cyst was identified arising from between the extensor tendon and the collateral ligament. Both of these structures were protected while the cyst was excised. Cyst was sent as pathology specimen. It appeared benign with a gelatinous fluid within. The root of the cyst was traced to the DIP joint.   There was a small bone spur on the dorsum of the DIP joint that was taken down with a micro-Ronguer. There was early arthritic change of the DIP joint but there was no evidence of full cartilaginous loss. The wound was copiously irrigated. Repeat inspection revealed no further abnormality. Cyst was gone. Images obtained demonstrated excision of osteophyte and appropriate alignment of DIP joint. Skin was closed with interrupted 5-0 nylon suture. Tourniquet was deflated and all fingers including operative digit were warm and well perfused. Incision was covered with Adaptic , sterile fluff, elastic tape  Patient was awoken from the sedation, tolerated the case quite well, was taken to the postanesthesia recovery in good condition. No complications during the case. Addendum: please note that 3 views of the right small finger were obtained demonstrating osteophyte with removal of osteophyte and appropriate alignment of DIP joint. Images saved and printed from [employer] and printed, interpreted by me. Findings:       Cyst    Intervention:  1% Xylocaine and 0.25% Marcaine, without epinephrine    Other Notes: Follow-up in 7-10 days for wound inspection and suture removal.  Patient will be sent over to the hand therapy team for a tip protector to be worn during activities, off her motion and hygiene. Therapy will also demonstrate a home program for range of motion, scar and tendon care. Progressive activities.         Nigel Rees MD   Hand & Upper Extremity Surgery  [facility]

## 2023-09-22 NOTE — H&P
I have reviewed the history and physical and examined the patient and find no relevant changes. I have reviewed with the patient and/or family the risks, benefits, and alternatives to the procedure.     Lucy Gama MD  9/22/2023

## 2023-09-22 NOTE — PROGRESS NOTES
Patient is resting in bed with call light.   Antibiotic on hold to OR: Clindamycin on hold to OR  Belongings: 1 bag to locked room with clothing, purse/tablet and glasses to cousin  Labs sent:  none ordered  Patient specific details: anxious does have panic attacks

## 2023-09-22 NOTE — BRIEF OP NOTE
Brief Postoperative Note      Patient: Flor Christensen  YOB: 1970  MRN: [id number]    Date of Procedure: 9/22/2023    Preop diagnosis: right small finger digital mucous cyst and osteophyte    Post-Op Diagnosis: Same       Procedure(s):  EXCISIONAL BIOPSY OF RIGHT SMALL FINGER DORSAL SOFT TISSUE MASS WITH EXCISION OF OSTEOPHYTE  2. Interpretation of fluoroscopy 3 views right small finger    Surgeon(s):  Daksha Marlow MD    Assistant:  Surgical Assistant: Estelle Fulton; [place]    Anesthesia: Monitor Anesthesia Care, local     Estimated Blood Loss (mL): less than 5ml      Complications: None immediate apparent     Specimens:   ID Type Source Tests Collected by Time Destination   A : A.  CYST RIGHT SMALL FINGER Tissue Tissue SURGICAL PATHOLOGY Daksha Marlow MD 9/22/2023 5031        Implants:  none      Drains: none    Findings: see fully dictated operative report   This procedure was not performed to treat primary cutaneous melanoma through wide local excision      Electronically signed by Bobby Jeffries MD on 9/22/2023 at 8:16 AM

## 2023-09-22 NOTE — DISCHARGE INSTRUCTIONS
[facility]   Post op Instructions for Hand and Upper Extremity Surgery    * Keep dressing dry and clean. It is ok to Shower just keep Dressing dry. * Elevate operative arm. * Ice 20 minutes on 20 minutes off. * Follow-up appointment as scheduled by office staff. Check paperwork from office. If no appointment scheduled call the office. * If dressing is too tight, you may loosen Ace bandage and leave splint in place. * Sling, as needed  * If you have any questions, refer to the office number listed below. [phone]  ([place])     Dr Hirsch Pod office with call you on Monday the 25 th to schedule a fallow up appointment    [facility]    There are potential side effects of anesthesia or sedation you may experience for the first 24 hours. These side effects include:    Confusion or Memory loss, Dizziness, or Delayed Reaction Times   [x]A responsible person should be with you for the next 24 hours. Do not operate any vehicles (automobiles, bicycles, motorcycles) or power tools or machinery for 24 hours. Do not sign any legal documents or make any legal decisions for 24 hours. Do not drink alcohol for 24 hours or while taking narcotic pain medication. Nausea    [x]Start with light diet and progress to your normal diet as you feel like eating. However, if you experience nausea or repeated episodes of vomiting which persist beyond 12-24 hours, notify your physician. Once nausea has passed, remember to keep drinking fluids. Difficulty Passing Urine  [x]Drink extra amounts of fluid today. Notify your physician if you have not urinated within 8 hours after your procedure or you feel uncomfortable. Irritated Throat from a Breathing Tube  [x]Drink extra amounts of fluid today. Lozenges may help.     Muscle Aches  [x]You may experience some generalized body aches as your muscles recover from

## 2023-09-25 ENCOUNTER — APPOINTMENT (OUTPATIENT)
Dept: PHYSICAL THERAPY | Age: 53
End: 2023-09-25
Payer: COMMERCIAL

## 2023-09-25 DIAGNOSIS — K76.0 FATTY LIVER: ICD-10-CM

## 2023-09-25 DIAGNOSIS — E78.00 PURE HYPERCHOLESTEROLEMIA: ICD-10-CM

## 2023-09-25 RX ORDER — ATORVASTATIN CALCIUM 40 MG/1
40 TABLET, FILM COATED ORAL DAILY
Qty: 90 TABLET | Refills: 2 | Status: SHIPPED | OUTPATIENT
Start: 2023-09-25

## 2023-09-25 NOTE — TELEPHONE ENCOUNTER
Requested Prescriptions     Pending Prescriptions Disp Refills    atorvastatin (LIPITOR) 40 MG tablet [Pharmacy Med Name: ATORVASTATIN TAB 40MG] 90 tablet 0     Sig: TAKE 1 TABLET DAILY        Last OV: 8/30/23    Last labs: 8/25/23     F/u: 12/27/23

## 2024-02-05 ENCOUNTER — OFFICE VISIT (OUTPATIENT)
Age: 54
End: 2024-02-05

## 2024-02-05 ENCOUNTER — TELEPHONE (OUTPATIENT)
Age: 54
End: 2024-02-05

## 2024-02-05 VITALS
RESPIRATION RATE: 16 BRPM | WEIGHT: 201.5 LBS | TEMPERATURE: 98.2 F | BODY MASS INDEX: 34.59 KG/M2 | HEART RATE: 96 BPM | OXYGEN SATURATION: 97 % | SYSTOLIC BLOOD PRESSURE: 130 MMHG | DIASTOLIC BLOOD PRESSURE: 82 MMHG

## 2024-02-05 DIAGNOSIS — Z78.9 PARTICIPANT IN HEALTH AND WELLNESS PLAN: Primary | ICD-10-CM

## 2024-02-05 DIAGNOSIS — E66.09 CLASS 1 OBESITY DUE TO EXCESS CALORIES WITHOUT SERIOUS COMORBIDITY WITH BODY MASS INDEX (BMI) OF 34.0 TO 34.9 IN ADULT: ICD-10-CM

## 2024-02-05 DIAGNOSIS — M67.441 DIGITAL MUCINOUS CYST OF FINGER OF RIGHT HAND: ICD-10-CM

## 2024-02-05 PROCEDURE — 99999 PR OFFICE/OUTPT VISIT,PROCEDURE ONLY: CPT | Performed by: FAMILY MEDICINE

## 2024-02-05 RX ORDER — NALTREXONE HYDROCHLORIDE 50 MG/1
50 TABLET, FILM COATED ORAL DAILY
Qty: 30 TABLET | Refills: 2 | Status: SHIPPED | OUTPATIENT
Start: 2024-02-05

## 2024-02-05 RX ORDER — BUPROPION HYDROCHLORIDE 150 MG/1
150 TABLET ORAL EVERY MORNING
Qty: 30 TABLET | Refills: 2 | Status: SHIPPED | OUTPATIENT
Start: 2024-02-05

## 2024-02-05 ASSESSMENT — PATIENT HEALTH QUESTIONNAIRE - PHQ9
SUM OF ALL RESPONSES TO PHQ QUESTIONS 1-9: 0
2. FEELING DOWN, DEPRESSED OR HOPELESS: 0
SUM OF ALL RESPONSES TO PHQ9 QUESTIONS 1 & 2: 0
1. LITTLE INTEREST OR PLEASURE IN DOING THINGS: 0
SUM OF ALL RESPONSES TO PHQ QUESTIONS 1-9: 0
SUM OF ALL RESPONSES TO PHQ QUESTIONS 1-9: 0

## 2024-02-05 NOTE — PROGRESS NOTES
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 without aura    Palpitations    Panic disorder without agoraphobia    Hyperlipemia    Elevated C-reactive protein    Coordination impairment    Prediabetes    Fatty liver    Focal nodular hyperplasia of liver    Plantar fasciitis    Closed fracture of one rib of left side with routine healing       Past Medical History:   Diagnosis Date    Anxiety     Fatty liver     Hyperlipidemia     Migraine     Palpitations     Panic attack     stressful situation    Plantar fasciitis     Rib fracture     May 2023       Past Surgical History:   Procedure Laterality Date    COLONOSCOPY      ENDOSCOPY, COLON, DIAGNOSTIC      HAND SURGERY Right 9/22/2023    EXCISIONAL BIOPSY OF RIGHT SMALL FINGER DORSAL SOFT TISSUE MASS WITH EXCISION OF OSTEOPHYTE performed by Garry Negrete MD at [facility] OR    WISDOM TOOTH EXTRACTION          Family History   Problem Relation Age of Onset    Stroke Father     High Cholesterol Father     Diabetes Mother     Hypertension Mother     Coronary Art Dis Mother     Migraines Mother         complex    High Cholesterol Mother        Social History     Tobacco Use    Smoking status: Never    Smokeless tobacco: Never   Vaping Use    Vaping Use: Never used   Substance Use Topics    Alcohol use: Not Currently     Comment: 1 per month    Drug use: No            Review of Systems  Review of Systems    Objective:   Physical Exam  Vitals:    02/05/24 1402   BP: 130/82   Site: Right Upper Arm   Position:

## 2024-02-05 NOTE — TELEPHONE ENCOUNTER
Please referral to Dr. Garry Negrete for mucinous cyst finger.  Needs to be sent to Dr. Negrete and she needs referral approved by her insurance.  Thanks

## 2024-02-06 NOTE — TELEPHONE ENCOUNTER
Dr. Garry Negrete not available in [facility] provider portal system   Called [facility] [phone]  Call Ref # last 4: [phone]  Will send print referral   Referral completed and faxed to [phone]

## 2024-02-26 DIAGNOSIS — E66.09 CLASS 1 OBESITY DUE TO EXCESS CALORIES WITHOUT SERIOUS COMORBIDITY WITH BODY MASS INDEX (BMI) OF 34.0 TO 34.9 IN ADULT: ICD-10-CM

## 2024-02-26 RX ORDER — BUPROPION HYDROCHLORIDE 300 MG/1
300 TABLET ORAL EVERY MORNING
Qty: 30 TABLET | Refills: 2 | Status: SHIPPED | OUTPATIENT
Start: 2024-02-26

## 2024-02-27 NOTE — TELEPHONE ENCOUNTER
Via fax refill for Wellbutrin hcl xl 150     Taylor, let's try increasing the Wellbutrin to 300 mg - you can take 2 of the 150 mg at the same time. I will order the 300 mg dose.     Rx sent 2/26/24

## 2024-04-25 DIAGNOSIS — K76.0 FATTY LIVER: ICD-10-CM

## 2024-04-25 DIAGNOSIS — E66.09 CLASS 1 OBESITY DUE TO EXCESS CALORIES WITHOUT SERIOUS COMORBIDITY WITH BODY MASS INDEX (BMI) OF 34.0 TO 34.9 IN ADULT: ICD-10-CM

## 2024-04-25 DIAGNOSIS — E78.00 PURE HYPERCHOLESTEROLEMIA: ICD-10-CM

## 2024-04-25 RX ORDER — NALTREXONE HYDROCHLORIDE 50 MG/1
50 TABLET, FILM COATED ORAL DAILY
Qty: 90 TABLET | Refills: 2 | Status: SHIPPED | OUTPATIENT
Start: 2024-04-25

## 2024-04-25 RX ORDER — ATORVASTATIN CALCIUM 40 MG/1
40 TABLET, FILM COATED ORAL DAILY
Qty: 90 TABLET | Refills: 2 | Status: SHIPPED | OUTPATIENT
Start: 2024-04-25

## 2024-04-25 NOTE — TELEPHONE ENCOUNTER
Requested Prescriptions     Pending Prescriptions Disp Refills    naltrexone (DEPADE) 50 MG tablet [Pharmacy Med Name: NALTREXONE 50 MG TABLET] 30 tablet 2     Sig: TAKE 1 TABLET BY MOUTH DAILY         Last OV: 2/5/2024    Last labs: 8/25/2023     F/u: 4/25/2024

## 2024-04-25 NOTE — TELEPHONE ENCOUNTER
Requested Prescriptions     Pending Prescriptions Disp Refills    atorvastatin (LIPITOR) 40 MG tablet 90 tablet 2     Sig: Take 1 tablet by mouth daily         Last OV: 2/5/2024    Last labs: 8/25/2023     F/u: 5/8/2024

## 2024-04-29 DIAGNOSIS — E66.09 CLASS 1 OBESITY DUE TO EXCESS CALORIES WITHOUT SERIOUS COMORBIDITY WITH BODY MASS INDEX (BMI) OF 34.0 TO 34.9 IN ADULT: ICD-10-CM

## 2024-04-29 RX ORDER — BUPROPION HYDROCHLORIDE 300 MG/1
300 TABLET ORAL EVERY MORNING
Qty: 90 TABLET | Refills: 1 | Status: SHIPPED | OUTPATIENT
Start: 2024-04-29

## 2024-04-29 NOTE — TELEPHONE ENCOUNTER
Requested Prescriptions     Pending Prescriptions Disp Refills    buPROPion (WELLBUTRIN XL) 300 MG extended release tablet 90 tablet 1     Sig: Take 1 tablet by mouth every morning         Last OV: 2/5/2024    Last labs: 8/25/2023     F/u: 5/8/2024      90 day refill request from pharmacy

## 2024-05-02 NOTE — PROGRESS NOTES
Panic disorder without agoraphobia    Hyperlipemia    Elevated C-reactive protein    Coordination impairment    Prediabetes    Fatty liver    Focal nodular hyperplasia of liver    Plantar fasciitis    Closed fracture of one rib of left side with routine healing       Past Medical History:   Diagnosis Date    Anxiety     Fatty liver     Hyperlipidemia     Migraine     Palpitations     Panic attack     stressful situation    Plantar fasciitis     Rib fracture     May 2023       Past Surgical History:   Procedure Laterality Date    COLONOSCOPY      ENDOSCOPY, COLON, DIAGNOSTIC      HAND SURGERY Right 9/22/2023    EXCISIONAL BIOPSY OF RIGHT SMALL FINGER DORSAL SOFT TISSUE MASS WITH EXCISION OF OSTEOPHYTE performed by Garry Negrete MD at [facility] OR    WISDOM TOOTH EXTRACTION          Family History   Problem Relation Age of Onset    Stroke Father     High Cholesterol Father     Diabetes Mother     Hypertension Mother     Coronary Art Dis Mother     Migraines Mother         complex    High Cholesterol Mother        Social History     Tobacco Use    Smoking status: Never    Smokeless tobacco: Never   Vaping Use    Vaping Use: Never used   Substance Use Topics    Alcohol use: Not Currently     Comment: 1 per month    Drug use: No            Review of Systems  Review of Systems    Objective:   Physical Exam  Vitals:    05/08/24 1403   BP: 114/66   Site: Right Upper Arm   Position: Sitting   Cuff Size: Large Adult   Pulse: 75   Resp: 16   Temp: 97.4 °F (36.3 °C)   TempSrc: Temporal   SpO2: 98%   Weight: 85.5 kg (188 lb 8 oz)     Wt Readings from Last 3 Encounters:   05/08/24 85.5 kg (188 lb 8 oz)   02/05/24 91.4 kg (201 lb 8 oz)   09/22/23 90.4 kg (199 lb 6.4 oz)        Physical Exam    NAD   Mood and affect are normal.   Skin is warm and dry. Well hydrated, no rash.   Chest is clear, no wheezing or rales. Normal symmetric air entry throughout both lung fields.   Heart regular with normal rate, no murmer or gallop   Right 5 th

## 2024-05-08 ENCOUNTER — OFFICE VISIT (OUTPATIENT)
Age: 54
End: 2024-05-08

## 2024-05-08 VITALS
SYSTOLIC BLOOD PRESSURE: 114 MMHG | RESPIRATION RATE: 16 BRPM | WEIGHT: 188.5 LBS | TEMPERATURE: 97.4 F | BODY MASS INDEX: 32.36 KG/M2 | OXYGEN SATURATION: 98 % | DIASTOLIC BLOOD PRESSURE: 66 MMHG | HEART RATE: 75 BPM

## 2024-05-08 DIAGNOSIS — Z00.00 WELL ADULT EXAM: ICD-10-CM

## 2024-05-08 DIAGNOSIS — Z78.9 PARTICIPANT IN HEALTH AND WELLNESS PLAN: Primary | ICD-10-CM

## 2024-05-08 DIAGNOSIS — M67.441 DIGITAL MUCINOUS CYST OF FINGER OF RIGHT HAND: ICD-10-CM

## 2024-05-08 PROCEDURE — 99999 PR OFFICE/OUTPT VISIT,PROCEDURE ONLY: CPT | Performed by: FAMILY MEDICINE

## 2024-05-28 ENCOUNTER — PATIENT MESSAGE (OUTPATIENT)
Age: 54
End: 2024-05-28

## 2024-05-28 DIAGNOSIS — M67.441 DIGITAL MUCINOUS CYST OF FINGER OF RIGHT HAND: Primary | ICD-10-CM

## 2024-05-28 NOTE — TELEPHONE ENCOUNTER
From: Taylor Mckeon  To: Dr. Paige Ruano  Sent: 5/28/2024 10:36 AM EDT  Subject: Ortho Referral    Hi Dr. Ruano,  I called to make an appointment with Dr. Ramey's practice but realized he is out of network for my insurance. It looks like I am limited to [facility] under my current plan. I don't mind driving to a location that is farther away. Please let me know your thoughts.    Thanks,  Taylor

## 2024-06-05 ENCOUNTER — OFFICE VISIT (OUTPATIENT)
Dept: ORTHOPEDIC SURGERY | Age: 54
End: 2024-06-05

## 2024-06-05 VITALS — WEIGHT: 188 LBS | BODY MASS INDEX: 32.1 KG/M2 | HEIGHT: 64 IN

## 2024-06-05 DIAGNOSIS — R60.9 POSTOPERATIVE EDEMA: Primary | ICD-10-CM

## 2024-06-05 SDOH — HEALTH STABILITY: PHYSICAL HEALTH: ON AVERAGE, HOW MANY MINUTES DO YOU ENGAGE IN EXERCISE AT THIS LEVEL?: 60 MIN

## 2024-06-05 SDOH — HEALTH STABILITY: PHYSICAL HEALTH: ON AVERAGE, HOW MANY DAYS PER WEEK DO YOU ENGAGE IN MODERATE TO STRENUOUS EXERCISE (LIKE A BRISK WALK)?: 6 DAYS

## 2024-06-05 NOTE — PROGRESS NOTES
This 53 y.o. right hand dominant retired woman is seen in consultation for Paige Ruano MD with a chief complaint of pain in the right little finger, DIP joint.  Symptoms have been present for 8 months, following surgery for removal of a ganglion cyst and bone spur (Dr. NAMRATA Negrete-9/22/23).  There was no postop complication such as wound healing or infection.   Pain is most severe on the dorsal aspect.  It is mild at rest and aggravated by movement, lifting and gripping. It does not radiate.  Swelling and erythema have been noticed.   Similar pain is not noted in the opposite upper extremity. Symptoms have not changed recently. The pain assessment has been reviewed and is correct.    The patient's social history, past medical history, family history, medications, allergies and review of systems, entered 6/5/24, have been reviewed, and dated and are recorded in the chart.    On physical examination the patient is Height: 162.6 cm (5' 4\") tall and weighs Weight - Scale: 85.3 kg (188 lb).  Respirations are 18 per minute.  The patient is well nourished, is oriented to time and place, demonstrates appropriate mood and affect as well as normal gait and station.  There are deformities of the DIP joints of several fingers consistent with osteoarthritis, including the opposite left little finger. There is mild soft tissue swelling present about the right little finger, dorsal, DIP joint.  There is mild erythema.  There is no scar problem or drainage.  Mild tenderness is present on palpation.  Range of motion of the finger is minimally limited secondary to swelling and  is not accompanied by pain.  Skin is intact, as is distal circulation and sensation.  Gross muscle strength is normal bilaterally.  Hand and wrist joints are stable.  There are no subcutaneous nodules or enlarged epitrochlear lymph nodes.    I have personally reviewed and interpreted all pertinent external imaging studies(Xrays), laboratory tests(CMP,

## 2024-07-31 NOTE — PROGRESS NOTES
Preoperative Consultation      Taylor Mckeon  YOB: 1970    Date of Service:  8/2/2024    Vitals:    08/02/24 0803   BP: 114/62   Site: Left Upper Arm   Position: Sitting   Pulse: 71   Resp: 16   Temp: 97.8 °F (36.6 °C)   TempSrc: Temporal   SpO2: 99%   Weight: 87.5 kg (193 lb)      Wt Readings from Last 2 Encounters:   08/02/24 87.5 kg (193 lb)   06/05/24 85.3 kg (188 lb)     BP Readings from Last 3 Encounters:   08/02/24 114/62   05/08/24 114/66   02/05/24 130/82        Chief Complaint   Patient presents with    Pre-op Exam     hysteroscopy 8/6- [facility]-dr mercedes river     Allergies   Allergen Reactions    Augmentin [Amoxicillin-Pot Clavulanate] Hives and Rash    Demerol Nausea And Vomiting    Tramadol Nausea Only and Headaches     Outpatient Medications Marked as Taking for the 8/2/24 encounter (Office Visit) with Paige Ruano MD   Medication Sig Dispense Refill    buPROPion (WELLBUTRIN XL) 300 MG extended release tablet Take 1 tablet by mouth every morning 90 tablet 1    naltrexone (DEPADE) 50 MG tablet TAKE 1 TABLET BY MOUTH DAILY 90 tablet 2    atorvastatin (LIPITOR) 40 MG tablet Take 1 tablet by mouth daily 90 tablet 2    UBRELVY 50 MG TABS TAKE ONE TABLET BY MOUTH AT ONSET OF MIGRAINE; MAY REPEAT ONE TABLET IN 2 HOURS IF NEEDED. MAX OF 2 TABLETS EVERY 24 HOURS 10 tablet 12    propranolol (INDERAL) 10 MG tablet Take 1 tablet by mouth  every 6 hours as needed (Patient taking differently: Take 1 tablet by mouth  every 6 hours as needed- for panic attacks) 90 tablet 2    halobetasol (ULTRAVATE) 0.05 % ointment Apply topically 2 times daily. 50 g 0    Fluocinonide (VANOS) 0.1 % CREA Apply to affected area BID PRN flares 120 g 0    clobetasol (TEMOVATE) 0.05 % cream Apply to affected area BID PRN flares 45 g 1    Melatonin 3-10 MG TABS Take by mouth      Calcium Carb-Cholecalciferol (CALCIUM-VITAMIN D) 500-400 MG-UNIT TABS Take 1 tablet by mouth daily      therapeutic

## 2024-08-02 ENCOUNTER — OFFICE VISIT (OUTPATIENT)
Age: 54
End: 2024-08-02
Payer: COMMERCIAL

## 2024-08-02 VITALS
HEART RATE: 71 BPM | RESPIRATION RATE: 16 BRPM | OXYGEN SATURATION: 99 % | WEIGHT: 193 LBS | DIASTOLIC BLOOD PRESSURE: 62 MMHG | SYSTOLIC BLOOD PRESSURE: 114 MMHG | TEMPERATURE: 97.8 F | BODY MASS INDEX: 33.13 KG/M2

## 2024-08-02 DIAGNOSIS — N95.0 POSTMENOPAUSAL BLEEDING: ICD-10-CM

## 2024-08-02 DIAGNOSIS — M25.522 LEFT ELBOW PAIN: ICD-10-CM

## 2024-08-02 DIAGNOSIS — Z01.818 PREOP EXAMINATION: Primary | ICD-10-CM

## 2024-08-02 DIAGNOSIS — Z00.00 WELL ADULT EXAM: ICD-10-CM

## 2024-08-02 LAB
ALBUMIN SERPL-MCNC: 4.4 G/DL (ref 3.4–5)
ALBUMIN/GLOB SERPL: 1.4 {RATIO} (ref 1.1–2.2)
ALP SERPL-CCNC: 99 U/L (ref 40–129)
ALT SERPL-CCNC: 19 U/L (ref 10–40)
ANION GAP SERPL CALCULATED.3IONS-SCNC: 9 MMOL/L (ref 3–16)
AST SERPL-CCNC: 23 U/L (ref 15–37)
BILIRUB SERPL-MCNC: 0.3 MG/DL (ref 0–1)
BUN SERPL-MCNC: 11 MG/DL (ref 7–20)
CALCIUM SERPL-MCNC: 9.8 MG/DL (ref 8.3–10.6)
CHLORIDE SERPL-SCNC: 102 MMOL/L (ref 99–110)
CHOLEST SERPL-MCNC: 160 MG/DL (ref 0–199)
CO2 SERPL-SCNC: 31 MMOL/L (ref 21–32)
CREAT SERPL-MCNC: 0.7 MG/DL (ref 0.6–1.1)
DEPRECATED RDW RBC AUTO: 15 % (ref 12.4–15.4)
EST. AVERAGE GLUCOSE BLD GHB EST-MCNC: 96.8 MG/DL
GFR SERPLBLD CREATININE-BSD FMLA CKD-EPI: >90 ML/MIN/{1.73_M2}
GLUCOSE SERPL-MCNC: 93 MG/DL (ref 70–99)
HBA1C MFR BLD: 5 %
HCT VFR BLD AUTO: 39.1 % (ref 36–48)
HDLC SERPL-MCNC: 50 MG/DL (ref 40–60)
HGB BLD-MCNC: 12.9 G/DL (ref 12–16)
LDLC SERPL CALC-MCNC: 72 MG/DL
MCH RBC QN AUTO: 28.1 PG (ref 26–34)
MCHC RBC AUTO-ENTMCNC: 32.9 G/DL (ref 31–36)
MCV RBC AUTO: 85.3 FL (ref 80–100)
PLATELET # BLD AUTO: 299 K/UL (ref 135–450)
PMV BLD AUTO: 9.3 FL (ref 5–10.5)
POTASSIUM SERPL-SCNC: 4.4 MMOL/L (ref 3.5–5.1)
PROT SERPL-MCNC: 7.6 G/DL (ref 6.4–8.2)
RBC # BLD AUTO: 4.58 M/UL (ref 4–5.2)
SODIUM SERPL-SCNC: 142 MMOL/L (ref 136–145)
T4 FREE SERPL-MCNC: 1.1 NG/DL (ref 0.9–1.8)
TRIGL SERPL-MCNC: 190 MG/DL (ref 0–150)
TSH SERPL DL<=0.005 MIU/L-ACNC: 4.93 UIU/ML (ref 0.27–4.2)
VLDLC SERPL CALC-MCNC: 38 MG/DL
WBC # BLD AUTO: 7.3 K/UL (ref 4–11)

## 2024-08-02 PROCEDURE — 93000 ELECTROCARDIOGRAM COMPLETE: CPT | Performed by: FAMILY MEDICINE

## 2024-08-02 PROCEDURE — 1036F TOBACCO NON-USER: CPT | Performed by: FAMILY MEDICINE

## 2024-08-02 PROCEDURE — G8427 DOCREV CUR MEDS BY ELIG CLIN: HCPCS | Performed by: FAMILY MEDICINE

## 2024-08-02 PROCEDURE — 99214 OFFICE O/P EST MOD 30 MIN: CPT | Performed by: FAMILY MEDICINE

## 2024-08-02 PROCEDURE — 3017F COLORECTAL CA SCREEN DOC REV: CPT | Performed by: FAMILY MEDICINE

## 2024-08-02 PROCEDURE — G8417 CALC BMI ABV UP PARAM F/U: HCPCS | Performed by: FAMILY MEDICINE

## 2024-08-02 SDOH — ECONOMIC STABILITY: FOOD INSECURITY: WITHIN THE PAST 12 MONTHS, YOU WORRIED THAT YOUR FOOD WOULD RUN OUT BEFORE YOU GOT MONEY TO BUY MORE.: NEVER TRUE

## 2024-08-02 SDOH — ECONOMIC STABILITY: INCOME INSECURITY: HOW HARD IS IT FOR YOU TO PAY FOR THE VERY BASICS LIKE FOOD, HOUSING, MEDICAL CARE, AND HEATING?: NOT HARD AT ALL

## 2024-08-02 SDOH — ECONOMIC STABILITY: FOOD INSECURITY: WITHIN THE PAST 12 MONTHS, THE FOOD YOU BOUGHT JUST DIDN'T LAST AND YOU DIDN'T HAVE MONEY TO GET MORE.: NEVER TRUE

## 2024-08-02 SDOH — ECONOMIC STABILITY: HOUSING INSECURITY
IN THE LAST 12 MONTHS, WAS THERE A TIME WHEN YOU DID NOT HAVE A STEADY PLACE TO SLEEP OR SLEPT IN A SHELTER (INCLUDING NOW)?: NO

## 2024-08-05 ENCOUNTER — TELEPHONE (OUTPATIENT)
Age: 54
End: 2024-08-05

## 2024-08-05 NOTE — TELEPHONE ENCOUNTER
Ling Stokes called from [facility], requesting EKG strip results from 8/2/24. Faxed to [phone] to Ling's attn.

## 2024-09-10 ENCOUNTER — OFFICE VISIT (OUTPATIENT)
Age: 54
End: 2024-09-10

## 2024-09-10 VITALS
OXYGEN SATURATION: 100 % | DIASTOLIC BLOOD PRESSURE: 70 MMHG | SYSTOLIC BLOOD PRESSURE: 122 MMHG | BODY MASS INDEX: 32.97 KG/M2 | WEIGHT: 192.1 LBS | TEMPERATURE: 97.2 F | HEART RATE: 98 BPM | RESPIRATION RATE: 16 BRPM

## 2024-09-10 DIAGNOSIS — Z23 NEED FOR INFLUENZA VACCINATION: ICD-10-CM

## 2024-09-10 DIAGNOSIS — E66.09 CLASS 1 OBESITY DUE TO EXCESS CALORIES WITHOUT SERIOUS COMORBIDITY WITH BODY MASS INDEX (BMI) OF 34.0 TO 34.9 IN ADULT: ICD-10-CM

## 2024-09-10 DIAGNOSIS — Z00.00 WELL ADULT EXAM: Primary | ICD-10-CM

## 2024-09-10 RX ORDER — TOPIRAMATE 25 MG/1
TABLET, FILM COATED ORAL
Qty: 42 TABLET | Refills: 5 | Status: SHIPPED | OUTPATIENT
Start: 2024-09-10

## 2024-09-10 ASSESSMENT — ENCOUNTER SYMPTOMS
TROUBLE SWALLOWING: 0
ANAL BLEEDING: 0
CONSTIPATION: 0
BLOOD IN STOOL: 0
CHEST TIGHTNESS: 0
COLOR CHANGE: 0
SHORTNESS OF BREATH: 0
BACK PAIN: 0
NAUSEA: 0
DIARRHEA: 0
VOICE CHANGE: 0
SORE THROAT: 0
ABDOMINAL PAIN: 0
WHEEZING: 0
CHOKING: 0

## 2024-10-16 ENCOUNTER — PATIENT MESSAGE (OUTPATIENT)
Age: 54
End: 2024-10-16

## 2024-10-16 DIAGNOSIS — E66.09 CLASS 1 OBESITY DUE TO EXCESS CALORIES WITHOUT SERIOUS COMORBIDITY WITH BODY MASS INDEX (BMI) OF 34.0 TO 34.9 IN ADULT: ICD-10-CM

## 2024-10-16 DIAGNOSIS — E66.811 CLASS 1 OBESITY DUE TO EXCESS CALORIES WITHOUT SERIOUS COMORBIDITY WITH BODY MASS INDEX (BMI) OF 34.0 TO 34.9 IN ADULT: ICD-10-CM

## 2024-10-16 RX ORDER — TOPIRAMATE 25 MG/1
TABLET, FILM COATED ORAL
Qty: 270 TABLET | Refills: 5 | Status: SHIPPED | OUTPATIENT
Start: 2024-10-16 | End: 2024-10-16 | Stop reason: DRUGHIGH

## 2024-10-16 RX ORDER — TOPIRAMATE 50 MG/1
50 TABLET, FILM COATED ORAL 2 TIMES DAILY
Qty: 180 TABLET | Refills: 1 | Status: SHIPPED | OUTPATIENT
Start: 2024-10-16

## 2024-11-22 DIAGNOSIS — E66.811 CLASS 1 OBESITY DUE TO EXCESS CALORIES WITHOUT SERIOUS COMORBIDITY WITH BODY MASS INDEX (BMI) OF 34.0 TO 34.9 IN ADULT: ICD-10-CM

## 2024-11-22 DIAGNOSIS — E66.09 CLASS 1 OBESITY DUE TO EXCESS CALORIES WITHOUT SERIOUS COMORBIDITY WITH BODY MASS INDEX (BMI) OF 34.0 TO 34.9 IN ADULT: ICD-10-CM

## 2024-11-22 RX ORDER — BUPROPION HYDROCHLORIDE 300 MG/1
300 TABLET ORAL EVERY MORNING
Qty: 90 TABLET | Refills: 1 | Status: SHIPPED | OUTPATIENT
Start: 2024-11-22

## 2024-11-22 NOTE — TELEPHONE ENCOUNTER
Requested Prescriptions     Pending Prescriptions Disp Refills    buPROPion (WELLBUTRIN XL) 300 MG extended release tablet [Pharmacy Med Name: buPROPion HCL  MG TABLET] 90 tablet 1     Sig: TAKE 1 TABLET BY MOUTH EVERY MORNING      Last Visit: 9/10/24    Last Labs: 8/2/24, labs pended    Next Visit: 3/13/25

## 2024-12-18 ENCOUNTER — TELEPHONE (OUTPATIENT)
Dept: ADMINISTRATIVE | Age: 54
End: 2024-12-18

## 2024-12-18 ENCOUNTER — PATIENT MESSAGE (OUTPATIENT)
Age: 54
End: 2024-12-18

## 2024-12-18 DIAGNOSIS — G43.009 MIGRAINE WITHOUT AURA AND WITHOUT STATUS MIGRAINOSUS, NOT INTRACTABLE: Primary | ICD-10-CM

## 2024-12-18 RX ORDER — UBROGEPANT 50 MG/1
1 TABLET ORAL DAILY PRN
Qty: 10 TABLET | Refills: 12 | Status: SHIPPED | OUTPATIENT
Start: 2024-12-18

## 2024-12-18 NOTE — TELEPHONE ENCOUNTER
Submitted PA for Ubrelvy 50MG tablets   Via [facility] (Key: [id number]) STATUS: PENDING.    Follow up done daily; if no decision with in three days we will refax.  If another three days goes by with no decision will call the insurance for status.

## 2024-12-18 NOTE — TELEPHONE ENCOUNTER
Requested Prescriptions     Pending Prescriptions Disp Refills    Ubrogepant (UBRELVY) 50 MG TABS 10 tablet 12         Last OV: 9/10/2024    Last labs: 8/2/2024     F/u: 3/13/2025

## 2024-12-19 RX ORDER — NARATRIPTAN 2.5 MG/1
TABLET ORAL
Qty: 12 TABLET | Refills: 5 | Status: SHIPPED | OUTPATIENT
Start: 2024-12-19

## 2024-12-19 NOTE — TELEPHONE ENCOUNTER
The medication was DENIED; DENIAL letter is uploaded to MEDIA.    Generic Denial: Patient must complete step therapy, Unless there is a contraindication that you can provide.       Note This medicine is covered only if: You have a history of therapeutic failure (after at least 3 migraine episodes and a minimum of a 30 day trial), contraindication or intolerance to one of the following (name and date tried required): (A)Naratriptan (Amerge).   (B)Rizatriptan (Maxalt/MaxaltMLT).     The information provided does not show that you meet the criteria listed above.      If you want an APPEAL; please note in this encounter what new information you would like to APPEAL with.  Once complete route back to PA POOL.    If this requires a response please respond to the pool ( P [facility] MEDICATION PRE-AUTH).      Thank you please advise patient.

## 2025-01-05 ENCOUNTER — PATIENT MESSAGE (OUTPATIENT)
Age: 55
End: 2025-01-05

## 2025-01-05 DIAGNOSIS — E66.09 CLASS 1 OBESITY DUE TO EXCESS CALORIES WITHOUT SERIOUS COMORBIDITY WITH BODY MASS INDEX (BMI) OF 34.0 TO 34.9 IN ADULT: ICD-10-CM

## 2025-01-05 DIAGNOSIS — E66.811 CLASS 1 OBESITY DUE TO EXCESS CALORIES WITHOUT SERIOUS COMORBIDITY WITH BODY MASS INDEX (BMI) OF 34.0 TO 34.9 IN ADULT: ICD-10-CM

## 2025-01-06 RX ORDER — NALTREXONE HYDROCHLORIDE 50 MG/1
50 TABLET, FILM COATED ORAL DAILY
Qty: 90 TABLET | Refills: 1 | Status: SHIPPED | OUTPATIENT
Start: 2025-01-06

## 2025-01-06 RX ORDER — BUPROPION HYDROCHLORIDE 300 MG/1
300 TABLET ORAL EVERY MORNING
Qty: 90 TABLET | Refills: 1 | Status: SHIPPED | OUTPATIENT
Start: 2025-01-06

## 2025-01-28 ENCOUNTER — PATIENT MESSAGE (OUTPATIENT)
Age: 55
End: 2025-01-28

## 2025-01-28 DIAGNOSIS — K76.0 FATTY LIVER: ICD-10-CM

## 2025-01-28 DIAGNOSIS — E78.00 PURE HYPERCHOLESTEROLEMIA: ICD-10-CM

## 2025-01-28 RX ORDER — ATORVASTATIN CALCIUM 40 MG/1
40 TABLET, FILM COATED ORAL DAILY
Qty: 90 TABLET | Refills: 1 | Status: SHIPPED | OUTPATIENT
Start: 2025-01-28

## 2025-01-28 RX ORDER — ATORVASTATIN CALCIUM 40 MG/1
40 TABLET, FILM COATED ORAL DAILY
Qty: 90 TABLET | Refills: 2 | OUTPATIENT
Start: 2025-01-28

## 2025-01-28 NOTE — TELEPHONE ENCOUNTER
Requested Prescriptions     Pending Prescriptions Disp Refills    atorvastatin (LIPITOR) 40 MG tablet 90 tablet 2     Sig: Take 1 tablet by mouth daily         Last OV: 9/10/2024    Last labs: 8/2/2024     F/u: 3/13/2025

## 2025-03-09 SDOH — ECONOMIC STABILITY: INCOME INSECURITY: IN THE LAST 12 MONTHS, WAS THERE A TIME WHEN YOU WERE NOT ABLE TO PAY THE MORTGAGE OR RENT ON TIME?: NO

## 2025-03-09 SDOH — ECONOMIC STABILITY: FOOD INSECURITY: WITHIN THE PAST 12 MONTHS, YOU WORRIED THAT YOUR FOOD WOULD RUN OUT BEFORE YOU GOT MONEY TO BUY MORE.: NEVER TRUE

## 2025-03-09 SDOH — ECONOMIC STABILITY: TRANSPORTATION INSECURITY
IN THE PAST 12 MONTHS, HAS THE LACK OF TRANSPORTATION KEPT YOU FROM MEDICAL APPOINTMENTS OR FROM GETTING MEDICATIONS?: NO

## 2025-03-09 SDOH — ECONOMIC STABILITY: FOOD INSECURITY: WITHIN THE PAST 12 MONTHS, THE FOOD YOU BOUGHT JUST DIDN'T LAST AND YOU DIDN'T HAVE MONEY TO GET MORE.: NEVER TRUE

## 2025-03-09 SDOH — ECONOMIC STABILITY: TRANSPORTATION INSECURITY
IN THE PAST 12 MONTHS, HAS LACK OF TRANSPORTATION KEPT YOU FROM MEETINGS, WORK, OR FROM GETTING THINGS NEEDED FOR DAILY LIVING?: NO

## 2025-03-11 NOTE — PROGRESS NOTES
No results found for: \"CHOLHDLRATIO\"     Outpatient Medications Marked as Taking for the 3/12/25 encounter (Office Visit) with Paige Ruano MD   Medication Sig Dispense Refill    atorvastatin (LIPITOR) 40 MG tablet Take 1 tablet by mouth daily 90 tablet 1    naltrexone (DEPADE) 50 MG tablet Take 1 tablet by mouth daily 90 tablet 1    buPROPion (WELLBUTRIN XL) 300 MG extended release tablet Take 1 tablet by mouth every morning 90 tablet 1    naratriptan (AMERGE) 2.5 MG tablet 2.5 mg at onset of headache, may repeat in 4 hours if needed. Max 2 tabs/24 hours. 12 tablet 5    Ubrogepant (UBRELVY) 50 MG TABS Take 1 tablet by mouth daily as needed (migraine) 10 tablet 12    propranolol (INDERAL) 10 MG tablet Take 1 tablet by mouth  every 6 hours as needed 90 tablet 2    halobetasol (ULTRAVATE) 0.05 % ointment Apply topically 2 times daily. 50 g 0    Fluocinonide (VANOS) 0.1 % CREA Apply to affected area BID PRN flares 120 g 0    clobetasol (TEMOVATE) 0.05 % cream Apply to affected area BID PRN flares 45 g 1    Melatonin 3-10 MG TABS Take by mouth as needed (for sleep)      Calcium Carb-Cholecalciferol (CALCIUM-VITAMIN D) 500-400 MG-UNIT TABS Take 1 tablet by mouth daily      therapeutic multivitamin-minerals (THERAGRAN-M) tablet Take 1 tablet by mouth daily          Allergies   Allergen Reactions    Augmentin [Amoxicillin-Pot Clavulanate] Hives and Rash    Demerol Nausea And Vomiting    Tramadol Nausea Only and Headaches       Patient Active Problem List   Diagnosis    Anxiety state    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

## 2025-03-12 ENCOUNTER — OFFICE VISIT (OUTPATIENT)
Age: 55
End: 2025-03-12
Payer: COMMERCIAL

## 2025-03-12 VITALS
WEIGHT: 195.2 LBS | DIASTOLIC BLOOD PRESSURE: 72 MMHG | OXYGEN SATURATION: 98 % | HEART RATE: 65 BPM | BODY MASS INDEX: 33.32 KG/M2 | SYSTOLIC BLOOD PRESSURE: 124 MMHG | TEMPERATURE: 98.4 F | RESPIRATION RATE: 16 BRPM | HEIGHT: 64 IN

## 2025-03-12 DIAGNOSIS — K76.0 FATTY LIVER: ICD-10-CM

## 2025-03-12 DIAGNOSIS — D12.6 ADENOMATOUS POLYP OF COLON, UNSPECIFIED PART OF COLON: ICD-10-CM

## 2025-03-12 DIAGNOSIS — Z78.9 PARTICIPANT IN HEALTH AND WELLNESS PLAN: Primary | ICD-10-CM

## 2025-03-12 PROCEDURE — G8427 DOCREV CUR MEDS BY ELIG CLIN: HCPCS | Performed by: FAMILY MEDICINE

## 2025-03-12 PROCEDURE — 1036F TOBACCO NON-USER: CPT | Performed by: FAMILY MEDICINE

## 2025-03-12 PROCEDURE — 3017F COLORECTAL CA SCREEN DOC REV: CPT | Performed by: FAMILY MEDICINE

## 2025-03-12 PROCEDURE — 99214 OFFICE O/P EST MOD 30 MIN: CPT | Performed by: FAMILY MEDICINE

## 2025-03-12 PROCEDURE — G8417 CALC BMI ABV UP PARAM F/U: HCPCS | Performed by: FAMILY MEDICINE

## 2025-03-12 SDOH — ECONOMIC STABILITY: FOOD INSECURITY: WITHIN THE PAST 12 MONTHS, YOU WORRIED THAT YOUR FOOD WOULD RUN OUT BEFORE YOU GOT MONEY TO BUY MORE.: NEVER TRUE

## 2025-03-12 SDOH — ECONOMIC STABILITY: FOOD INSECURITY: WITHIN THE PAST 12 MONTHS, THE FOOD YOU BOUGHT JUST DIDN'T LAST AND YOU DIDN'T HAVE MONEY TO GET MORE.: NEVER TRUE

## 2025-03-12 ASSESSMENT — PATIENT HEALTH QUESTIONNAIRE - PHQ9
5. POOR APPETITE OR OVEREATING: NOT AT ALL
SUM OF ALL RESPONSES TO PHQ QUESTIONS 1-9: 4
SUM OF ALL RESPONSES TO PHQ QUESTIONS 1-9: 4
4. FEELING TIRED OR HAVING LITTLE ENERGY: NOT AT ALL
SUM OF ALL RESPONSES TO PHQ QUESTIONS 1-9: 4
6. FEELING BAD ABOUT YOURSELF - OR THAT YOU ARE A FAILURE OR HAVE LET YOURSELF OR YOUR FAMILY DOWN: NOT AT ALL
9. THOUGHTS THAT YOU WOULD BE BETTER OFF DEAD, OR OF HURTING YOURSELF: NOT AT ALL
1. LITTLE INTEREST OR PLEASURE IN DOING THINGS: NOT AT ALL
3. TROUBLE FALLING OR STAYING ASLEEP: SEVERAL DAYS
8. MOVING OR SPEAKING SO SLOWLY THAT OTHER PEOPLE COULD HAVE NOTICED. OR THE OPPOSITE, BEING SO FIGETY OR RESTLESS THAT YOU HAVE BEEN MOVING AROUND A LOT MORE THAN USUAL: NOT AT ALL
7. TROUBLE CONCENTRATING ON THINGS, SUCH AS READING THE NEWSPAPER OR WATCHING TELEVISION: NOT AT ALL
10. IF YOU CHECKED OFF ANY PROBLEMS, HOW DIFFICULT HAVE THESE PROBLEMS MADE IT FOR YOU TO DO YOUR WORK, TAKE CARE OF THINGS AT HOME, OR GET ALONG WITH OTHER PEOPLE: SOMEWHAT DIFFICULT
SUM OF ALL RESPONSES TO PHQ QUESTIONS 1-9: 4
2. FEELING DOWN, DEPRESSED OR HOPELESS: NEARLY EVERY DAY

## 2025-03-18 ENCOUNTER — TRANSCRIBE ORDERS (OUTPATIENT)
Dept: ADMINISTRATIVE | Age: 55
End: 2025-03-18

## 2025-03-18 DIAGNOSIS — K76.0 FATTY LIVER: Primary | ICD-10-CM

## 2025-03-24 ENCOUNTER — HOSPITAL ENCOUNTER (OUTPATIENT)
Dept: ULTRASOUND IMAGING | Age: 55
Discharge: HOME | End: 2025-03-24
Payer: COMMERCIAL

## 2025-03-24 DIAGNOSIS — K76.0 FATTY LIVER: ICD-10-CM

## 2025-03-24 PROCEDURE — 76981 USE PARENCHYMA: CPT

## 2025-03-24 PROCEDURE — 76705 ECHO EXAM OF ABDOMEN: CPT

## 2025-03-26 ENCOUNTER — RESULTS FOLLOW-UP (OUTPATIENT)
Age: 55
End: 2025-03-26

## 2025-05-19 ENCOUNTER — RESULTS FOLLOW-UP (OUTPATIENT)
Age: 55
End: 2025-05-19

## 2025-06-09 NOTE — PROGRESS NOTES
Subjective:      Patient ID: Taylor Mckeon 54 y.o. female. The primary encounter diagnosis was Participant in health and wellness plan. A diagnosis of Adenomatous polyp of colon, unspecified part of colon was also pertinent to this visit.      HPI    Colon polyp: due follow up colonoscopy.  Is scheduled for August.    Vaccines: due pneumococcal.    Exercising regularly.  Diet: traveling a lot so not eating as well.       Outpatient Medications Marked as Taking for the 6/12/25 encounter (Office Visit) with Paige Ruano MD   Medication Sig Dispense Refill    atorvastatin (LIPITOR) 40 MG tablet Take 1 tablet by mouth daily 90 tablet 1    naltrexone (DEPADE) 50 MG tablet Take 1 tablet by mouth daily 90 tablet 1    buPROPion (WELLBUTRIN XL) 300 MG extended release tablet Take 1 tablet by mouth every morning 90 tablet 1    naratriptan (AMERGE) 2.5 MG tablet 2.5 mg at onset of headache, may repeat in 4 hours if needed. Max 2 tabs/24 hours. 12 tablet 5    Ubrogepant (UBRELVY) 50 MG TABS Take 1 tablet by mouth daily as needed (migraine) 10 tablet 12    propranolol (INDERAL) 10 MG tablet Take 1 tablet by mouth  every 6 hours as needed 90 tablet 2    halobetasol (ULTRAVATE) 0.05 % ointment Apply topically 2 times daily. 50 g 0    Fluocinonide (VANOS) 0.1 % CREA Apply to affected area BID PRN flares 120 g 0    clobetasol (TEMOVATE) 0.05 % cream Apply to affected area BID PRN flares 45 g 1    Melatonin 3-10 MG TABS Take by mouth as needed (for sleep)      Calcium Carb-Cholecalciferol (CALCIUM-VITAMIN D) 500-400 MG-UNIT TABS Take 1 tablet by mouth daily      therapeutic multivitamin-minerals (THERAGRAN-M) tablet Take 1 tablet by mouth daily          Allergies   Allergen Reactions    Augmentin [Amoxicillin-Pot Clavulanate] Hives and Rash    Demerol Nausea And Vomiting    Tramadol Nausea Only and Headaches       Patient Active Problem List   Diagnosis    Anxiety state    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

## 2025-06-12 ENCOUNTER — OFFICE VISIT (OUTPATIENT)
Age: 55
End: 2025-06-12

## 2025-06-12 VITALS
RESPIRATION RATE: 16 BRPM | HEART RATE: 72 BPM | HEIGHT: 64 IN | WEIGHT: 197.9 LBS | TEMPERATURE: 97.3 F | BODY MASS INDEX: 33.79 KG/M2 | SYSTOLIC BLOOD PRESSURE: 122 MMHG | OXYGEN SATURATION: 100 % | DIASTOLIC BLOOD PRESSURE: 70 MMHG

## 2025-06-12 DIAGNOSIS — Z78.9 PARTICIPANT IN HEALTH AND WELLNESS PLAN: Primary | ICD-10-CM

## 2025-06-12 DIAGNOSIS — Z00.00 WELL ADULT EXAM: ICD-10-CM

## 2025-06-12 DIAGNOSIS — D12.6 ADENOMATOUS POLYP OF COLON, UNSPECIFIED PART OF COLON: ICD-10-CM

## 2025-06-18 ENCOUNTER — OFFICE VISIT (OUTPATIENT)
Age: 55
End: 2025-06-18
Payer: COMMERCIAL

## 2025-06-18 VITALS
DIASTOLIC BLOOD PRESSURE: 86 MMHG | BODY MASS INDEX: 33.77 KG/M2 | WEIGHT: 197.8 LBS | HEIGHT: 64 IN | OXYGEN SATURATION: 98 % | HEART RATE: 86 BPM | SYSTOLIC BLOOD PRESSURE: 126 MMHG

## 2025-06-18 DIAGNOSIS — Z00.00 WELL ADULT EXAM: ICD-10-CM

## 2025-06-18 DIAGNOSIS — R00.2 PALPITATIONS: Primary | ICD-10-CM

## 2025-06-18 PROCEDURE — 99214 OFFICE O/P EST MOD 30 MIN: CPT | Performed by: FAMILY MEDICINE

## 2025-06-18 PROCEDURE — 93000 ELECTROCARDIOGRAM COMPLETE: CPT | Performed by: FAMILY MEDICINE

## 2025-06-19 DIAGNOSIS — Z00.00 WELL ADULT EXAM: ICD-10-CM

## 2025-06-19 DIAGNOSIS — R00.2 PALPITATIONS: ICD-10-CM

## 2025-06-21 ENCOUNTER — PATIENT MESSAGE (OUTPATIENT)
Age: 55
End: 2025-06-21

## 2025-06-21 DIAGNOSIS — R73.02 IMPAIRED GLUCOSE TOLERANCE: Primary | ICD-10-CM

## 2025-06-21 DIAGNOSIS — Z00.00 WELL ADULT EXAM: ICD-10-CM

## 2025-06-25 ENCOUNTER — HOSPITAL ENCOUNTER (OUTPATIENT)
Age: 55
Discharge: HOME | End: 2025-06-27
Payer: COMMERCIAL

## 2025-06-25 DIAGNOSIS — R00.2 PALPITATIONS: ICD-10-CM

## 2025-06-25 PROCEDURE — 93226 XTRNL ECG REC<48 HR SCAN A/R: CPT

## 2025-06-27 DIAGNOSIS — R73.02 IMPAIRED GLUCOSE TOLERANCE: ICD-10-CM

## 2025-06-27 DIAGNOSIS — Z00.00 WELL ADULT EXAM: ICD-10-CM

## 2025-06-28 LAB
ALBUMIN SERPL-MCNC: 4.6 G/DL (ref 3.4–5)
ALBUMIN/GLOB SERPL: 1.4 {RATIO} (ref 1.1–2.2)
ALP SERPL-CCNC: 101 U/L (ref 40–129)
ALT SERPL-CCNC: 25 U/L (ref 10–40)
ANION GAP SERPL CALCULATED.3IONS-SCNC: 12 MMOL/L (ref 3–16)
AST SERPL-CCNC: 32 U/L (ref 15–37)
BILIRUB SERPL-MCNC: 1.1 MG/DL (ref 0–1)
BUN SERPL-MCNC: 10 MG/DL (ref 7–20)
CALCIUM SERPL-MCNC: 10.2 MG/DL (ref 8.3–10.6)
CHLORIDE SERPL-SCNC: 98 MMOL/L (ref 99–110)
CHOLEST SERPL-MCNC: 176 MG/DL (ref 0–199)
CO2 SERPL-SCNC: 27 MMOL/L (ref 21–32)
CREAT SERPL-MCNC: 0.9 MG/DL (ref 0.6–1.1)
DEPRECATED RDW RBC AUTO: 14.8 % (ref 12.4–15.4)
EST. AVERAGE GLUCOSE BLD GHB EST-MCNC: 96.8 MG/DL
GFR SERPLBLD CREATININE-BSD FMLA CKD-EPI: 75 ML/MIN/{1.73_M2}
GLUCOSE SERPL-MCNC: 82 MG/DL (ref 70–99)
HBA1C MFR BLD: 5 %
HCT VFR BLD AUTO: 41.2 % (ref 36–48)
HDLC SERPL-MCNC: 48 MG/DL (ref 40–60)
HGB BLD-MCNC: 13.6 G/DL (ref 12–16)
LDLC SERPL CALC-MCNC: 92 MG/DL
MCH RBC QN AUTO: 27.9 PG (ref 26–34)
MCHC RBC AUTO-ENTMCNC: 33.2 G/DL (ref 31–36)
MCV RBC AUTO: 84.1 FL (ref 80–100)
PLATELET # BLD AUTO: 380 K/UL (ref 135–450)
PMV BLD AUTO: 9 FL (ref 5–10.5)
POTASSIUM SERPL-SCNC: 5.1 MMOL/L (ref 3.5–5.1)
PROT SERPL-MCNC: 7.8 G/DL (ref 6.4–8.2)
RBC # BLD AUTO: 4.89 M/UL (ref 4–5.2)
SODIUM SERPL-SCNC: 137 MMOL/L (ref 136–145)
TRIGL SERPL-MCNC: 181 MG/DL (ref 0–150)
TSH SERPL DL<=0.005 MIU/L-ACNC: 3.38 UIU/ML (ref 0.27–4.2)
VLDLC SERPL CALC-MCNC: 36 MG/DL
WBC # BLD AUTO: 8.1 K/UL (ref 4–11)

## 2025-06-30 ENCOUNTER — OFFICE VISIT (OUTPATIENT)
Age: 55
End: 2025-06-30
Payer: COMMERCIAL

## 2025-06-30 ENCOUNTER — RESULTS FOLLOW-UP (OUTPATIENT)
Age: 55
End: 2025-06-30

## 2025-06-30 DIAGNOSIS — Z80.8 FAMILY HISTORY OF MALIGNANT MELANOMA: ICD-10-CM

## 2025-06-30 DIAGNOSIS — D17.0 LIPOMA OF FOREHEAD: ICD-10-CM

## 2025-06-30 DIAGNOSIS — L82.1 SEBORRHEIC KERATOSIS: ICD-10-CM

## 2025-06-30 DIAGNOSIS — D22.9 MULTIPLE NEVI: Primary | ICD-10-CM

## 2025-06-30 DIAGNOSIS — L72.0 EPIDERMOID CYST: ICD-10-CM

## 2025-06-30 DIAGNOSIS — L30.9 DERMATITIS: ICD-10-CM

## 2025-06-30 PROCEDURE — 99213 OFFICE O/P EST LOW 20 MIN: CPT | Performed by: DERMATOLOGY

## 2025-06-30 NOTE — PROGRESS NOTES
[facility]  Evelia Prince MD  [phone]      Taylor Mckeon  1970    54 y.o. female     Date of Visit: 6/30/2025    Chief Complaint: moles, rash  Chief Complaint   Patient presents with    Skin Exam     Last seen: ~ 9-2023    History of Present Illness:    *Her mom passed away in fall of 2016.  Dad passed away in May 2020.    1. Here for evaluation of multiple asx pigmented lesions on the trunk and extremities, present for many years; no change in size/shape/color of any lesions; no bleeding lesions.  Family hx of melanoma.    2. She has a papule on the back x many years - asx currently.    3. F/u dermatitis - previously on the shin/legs, hands and scalp.  rx'd clobetasol in the past - this helps.  No flares recently.    4.  Also with subtle papule/nodule on the R upper FH x at least several years duration - noted on exam and is asx. Unchanged.    *surgery summer 2024 - very itchy in areas of betadine - eventually resolved    Few corns on the plantar surfaces in the past - better in recent years.    No personal hx of skin cancer.  Father with hx of melanoma.    Wears sunscreen regularly.  No tanning bed use.    Review of Systems:  Gen: Feels well, good sense of health.  Skin: No changing moles or lesions.    Past Medical History, Family History, Surgical History, Medications and Allergies reviewed.    Past Medical History:   Diagnosis Date    Anxiety     Fatty liver     Hyperlipidemia     Migraine     Palpitations     Panic attack     stressful situation    Plantar fasciitis     Rib fracture     May 2023       Past Surgical History:   Procedure Laterality Date    COLONOSCOPY      ENDOSCOPY, COLON, DIAGNOSTIC      HAND SURGERY Right 9/22/2023    EXCISIONAL BIOPSY OF RIGHT SMALL FINGER DORSAL SOFT TISSUE MASS WITH EXCISION OF OSTEOPHYTE performed by Garry Negrete MD at [facility] OR    WISDOM TOOTH EXTRACTION         Outpatient Medications Marked as Taking for the 6/30/25 encounter

## 2025-07-03 ENCOUNTER — RESULTS FOLLOW-UP (OUTPATIENT)
Age: 55
End: 2025-07-03

## 2025-07-03 LAB
ACQUISITION DURATION: NORMAL S
AVERAGE HEART RATE: 76 BPM
HOOKUP DATE: NORMAL
HOOKUP TIME: NORMAL
MAX HEART RATE TIME/DATE: NORMAL
MAX HEART RATE: 104 BPM
MIN HEART RATE TIME/DATE: NORMAL
MIN HEART RATE: 55 BPM
NUMBER OF QRS COMPLEXES: NORMAL
NUMBER OF SUPRAVENTRICULAR ECTOPICS: 203
NUMBER OF SUPRAVENTRICULAR ISOLATED BEATS: 125
NUMBER OF VENTRICULAR BIGEMINAL CYCLES: 0
NUMBER OF VENTRICULAR COUPLETS: 0
NUMBER OF VENTRICULAR ECTOPICS: 1

## 2025-07-16 ENCOUNTER — TELEPHONE (OUTPATIENT)
Age: 55
End: 2025-07-16

## 2025-07-16 NOTE — TELEPHONE ENCOUNTER
Pt has an 11-year-old boy staying with her. He goes to summer camp. He was playing soccer a lot yesterday.     He has not wanted to eat much in the past 24 hours, but did eat a little this morning. Feels slightly better this morning.    He has been dizzy, slight headache, his feet hurt and have some numbness, hands were shaking but after he was able to eat the hand shaking dissipated.     Pt is asking for advice.

## 2025-08-18 DIAGNOSIS — K76.0 FATTY LIVER: ICD-10-CM

## 2025-08-18 DIAGNOSIS — E78.00 PURE HYPERCHOLESTEROLEMIA: ICD-10-CM

## 2025-08-18 RX ORDER — ATORVASTATIN CALCIUM 40 MG/1
40 TABLET, FILM COATED ORAL DAILY
Qty: 90 TABLET | Refills: 2 | Status: SHIPPED | OUTPATIENT
Start: 2025-08-18

## (undated) DEVICE — UNDERGLOVE SURG SZ 8 BLU LTX FREE SYN POLYISOPRENE POLYMER

## (undated) DEVICE — COVER LT HNDL BLU PLAS

## (undated) DEVICE — TOURNIQUET PHLEB M AD FNGR RED SIL RNG DISP TOURNI-COT

## (undated) DEVICE — GARMENT,MEDLINE,DVT,INT,CALF,MED, GEN2: Brand: MEDLINE

## (undated) DEVICE — TOWEL,STOP FLAG GOLD N-W: Brand: MEDLINE

## (undated) DEVICE — GLOVE ORTHO 7 1/2   MSG9475

## (undated) DEVICE — PADDING CAST W4INXL4YD HIGHLY ABSRB THAN COT EZ APPL

## (undated) DEVICE — HAND: Brand: MEDLINE INDUSTRIES, INC.

## (undated) DEVICE — BANDAGE,GAUZE,CONFORMING,3"X75",STRL,LF: Brand: MEDLINE

## (undated) DEVICE — BLADE OPHTH 180DEG CUT SURF BLU STR SHRP DBL BVL GRINDLESS

## (undated) DEVICE — TRAY,IRRIGATION,BULBSYRINGE,60ML,CSR,PVP: Brand: MEDLINE

## (undated) DEVICE — GOWN,SIRUS,POLYRNF,BRTHSLV,XLN/XL,20/CS: Brand: MEDLINE